# Patient Record
Sex: FEMALE | Race: WHITE | NOT HISPANIC OR LATINO | Employment: OTHER
[De-identification: names, ages, dates, MRNs, and addresses within clinical notes are randomized per-mention and may not be internally consistent; named-entity substitution may affect disease eponyms.]

---

## 2017-02-08 PROCEDURE — 36415 COLL VENOUS BLD VENIPUNCTURE: CPT | Performed by: NURSE PRACTITIONER

## 2017-02-08 PROCEDURE — 86140 C-REACTIVE PROTEIN: CPT | Performed by: NURSE PRACTITIONER

## 2017-02-08 PROCEDURE — 85652 RBC SED RATE AUTOMATED: CPT | Performed by: NURSE PRACTITIONER

## 2017-04-13 ENCOUNTER — PRIOR ORIGINAL RECORDS (OUTPATIENT)
Dept: OTHER | Age: 77
End: 2017-04-13

## 2017-04-14 LAB
ALBUMIN: 4.1 G/DL
ALKALINE PHOSPHATATE(ALK PHOS): 36 IU/L
ALT (SGPT): 19 U/L
AST (SGOT): 19 U/L
BILIRUBIN TOTAL: 0.4 MG/DL
BILIRUBIN TOTAL: 0.4 MG/DL
BUN: 21 MG/DL
CALCIUM: 9.6 MG/DL
CHLORIDE: 103 MEQ/L
CHOLESTEROL, TOTAL: 205 MG/DL
CREATININE, SERUM: 0.83 MG/DL
GLOBULIN: 2.5 G/DL
GLUCOSE: 95 MG/DL
GLUCOSE: 95 MG/DL
HDL CHOLESTEROL: 79 MG/DL
HEMATOCRIT: 40.2 %
HEMOGLOBIN: 13.2 G/DL
LDL CHOLESTEROL: 115 MG/DL
NON-HDL CHOLESTEROL: 126 MG/DL
PLATELETS: 231 K/UL
POTASSIUM, SERUM: 4.5 MEQ/L
PROTEIN, TOTAL: 6.6 G/DL
RED BLOOD COUNT: 4.27 X 10-6/U
SGOT (AST): 19 IU/L
SGPT (ALT): 19 IU/L
SODIUM: 140 MEQ/L
TOTAL CHOLESTEROL / HDL RATIO: 2.6 RATIO UN
TRIGLYCERIDES: 55 MG/DL
VITAMIN D 25-OH: 48 NG/ML
WHITE BLOOD COUNT: 4.7 X 10-3/U

## 2017-05-04 ENCOUNTER — PRIOR ORIGINAL RECORDS (OUTPATIENT)
Dept: OTHER | Age: 77
End: 2017-05-04

## 2017-05-22 ENCOUNTER — PRIOR ORIGINAL RECORDS (OUTPATIENT)
Dept: OTHER | Age: 77
End: 2017-05-22

## 2017-10-11 ENCOUNTER — HOSPITAL (OUTPATIENT)
Dept: OTHER | Age: 77
End: 2017-10-11
Attending: INTERNAL MEDICINE

## 2018-05-07 ENCOUNTER — PRIOR ORIGINAL RECORDS (OUTPATIENT)
Dept: OTHER | Age: 78
End: 2018-05-07

## 2018-05-14 ENCOUNTER — MYAURORA ACCOUNT LINK (OUTPATIENT)
Dept: OTHER | Age: 78
End: 2018-05-14

## 2018-05-14 ENCOUNTER — PRIOR ORIGINAL RECORDS (OUTPATIENT)
Dept: OTHER | Age: 78
End: 2018-05-14

## 2018-05-15 LAB
ALBUMIN: 4 G/DL
ALKALINE PHOSPHATATE(ALK PHOS): 37 IU/L
BILIRUBIN TOTAL: 0.3 MG/DL
BUN: 21 MG/DL
CALCIUM: 9 MG/DL
CHLORIDE: 103 MEQ/L
CHOLESTEROL, TOTAL: 183 MG/DL
CREATININE, SERUM: 0.75 MG/DL
GLOBULIN: 2.6 G/DL
GLUCOSE: 98 MG/DL
HDL CHOLESTEROL: 74 MG/DL
HEMATOCRIT: 40.9 %
HEMOGLOBIN: 13.2 G/DL
LDL CHOLESTEROL: 94 MG/DL
MCH: 30.5 PG
MCHC: 32.3 G/DL
MCV: 94.5 FL
PLATELETS: 250 K/UL
POTASSIUM, SERUM: 3.6 MEQ/L
PROTEIN, TOTAL: 6.6 G/DL
RED BLOOD COUNT: 4.33 X 10-6/U
SGOT (AST): 23 IU/L
SGPT (ALT): 19 IU/L
SODIUM: 141 MEQ/L
TRIGLYCERIDES: 60 MG/DL
VITAMIN D 25-OH: 41 NG/ML
WHITE BLOOD COUNT: 4.3 X 10-3/U

## 2018-05-23 ENCOUNTER — MYAURORA ACCOUNT LINK (OUTPATIENT)
Dept: OTHER | Age: 78
End: 2018-05-23

## 2018-05-23 ENCOUNTER — PRIOR ORIGINAL RECORDS (OUTPATIENT)
Dept: OTHER | Age: 78
End: 2018-05-23

## 2018-06-07 ENCOUNTER — PRIOR ORIGINAL RECORDS (OUTPATIENT)
Dept: OTHER | Age: 78
End: 2018-06-07

## 2018-10-15 ENCOUNTER — HOSPITAL (OUTPATIENT)
Dept: OTHER | Age: 78
End: 2018-10-15
Attending: INTERNAL MEDICINE

## 2019-02-28 VITALS
HEART RATE: 48 BPM | RESPIRATION RATE: 16 BRPM | WEIGHT: 107 LBS | DIASTOLIC BLOOD PRESSURE: 74 MMHG | SYSTOLIC BLOOD PRESSURE: 160 MMHG | HEIGHT: 59 IN | BODY MASS INDEX: 21.57 KG/M2

## 2019-03-01 VITALS
BODY MASS INDEX: 21.17 KG/M2 | SYSTOLIC BLOOD PRESSURE: 125 MMHG | DIASTOLIC BLOOD PRESSURE: 65 MMHG | HEART RATE: 56 BPM | HEIGHT: 59 IN | RESPIRATION RATE: 16 BRPM | WEIGHT: 105 LBS

## 2019-03-26 RX ORDER — ALPRAZOLAM 0.25 MG/1
TABLET ORAL
COMMUNITY
End: 2020-06-03 | Stop reason: CLARIF

## 2019-03-26 RX ORDER — HYDROCHLOROTHIAZIDE 25 MG/1
TABLET ORAL
COMMUNITY
End: 2020-06-05 | Stop reason: SDUPTHER

## 2019-03-26 RX ORDER — ATORVASTATIN CALCIUM 10 MG/1
TABLET, FILM COATED ORAL
COMMUNITY
End: 2019-05-22 | Stop reason: SDUPTHER

## 2019-03-26 RX ORDER — SERTRALINE HYDROCHLORIDE 25 MG/1
TABLET, FILM COATED ORAL
COMMUNITY
End: 2023-06-06 | Stop reason: ALTCHOICE

## 2019-03-28 RX ORDER — MELATONIN: COMMUNITY

## 2019-05-23 RX ORDER — ATORVASTATIN CALCIUM 10 MG/1
10 TABLET, FILM COATED ORAL DAILY
Qty: 90 TABLET | Refills: 0 | Status: SHIPPED | OUTPATIENT
Start: 2019-05-23 | End: 2019-09-14 | Stop reason: SDUPTHER

## 2019-06-04 LAB
25(OH)D3+25(OH)D2 SERPL-MCNC: 35 NG/ML
ABSOLUTE IMMATURE GRANULOCYTES (OFFPRE24): NORMAL
ALBUMIN SERPL-MCNC: 4 G/DL
ALBUMIN/GLOB SERPL: 1.6 {RATIO}
ALP SERPL-CCNC: 37 U/L
ALT SERPL-CCNC: 34 U/L
ANION GAP SERPL CALC-SCNC: NORMAL MMOL/L
AST SERPL-CCNC: 25 U/L
BASO+EOS+MONOS # BLD: NORMAL 10*3/UL
BASO+EOS+MONOS NFR BLD: NORMAL %
BASOPHILS # BLD: NORMAL 10*3/UL
BASOPHILS NFR BLD: NORMAL %
BILIRUB SERPL-MCNC: 0.6 MG/DL
BUN SERPL-MCNC: 20 MG/DL
BUN/CREAT SERPL: NORMAL
CALCIUM SERPL-MCNC: 8.9 MG/DL
CHLORIDE SERPL-SCNC: 104 MMOL/L
CHOLEST SERPL-MCNC: 171 MG/DL
CHOLEST/HDLC SERPL: 2.4 {RATIO}
CO2 SERPL-SCNC: 30 MMOL/L
CREAT SERPL-MCNC: 0.69 MG/DL
DIFFERENTIAL METHOD BLD: NORMAL
EOSINOPHIL # BLD: NORMAL 10*3/UL
EOSINOPHIL NFR BLD: NORMAL %
ERYTHROCYTE [DISTWIDTH] IN BLOOD: NORMAL %
GLOBULIN SER-MCNC: 2.5 G/DL
GLUCOSE SERPL-MCNC: 93 MG/DL
HCT VFR BLD CALC: 39.9 %
HDLC SERPL-MCNC: 72 MG/DL
HGB BLD-MCNC: 13.1 G/DL
IMMATURE GRANULOCYTES (OFFPRE25): NORMAL
LDLC SERPL CALC-MCNC: 85 MG/DL
LENGTH OF FAST TIME PATIENT: NORMAL H
LENGTH OF FAST TIME PATIENT: NORMAL H
LYMPHOCYTES # BLD: NORMAL 10*3/UL
LYMPHOCYTES NFR BLD: NORMAL %
MCH RBC QN AUTO: NORMAL PG
MCHC RBC AUTO-ENTMCNC: NORMAL G/DL
MCV RBC AUTO: NORMAL FL
MONOCYTES # BLD: NORMAL 10*3/UL
MONOCYTES NFR BLD: NORMAL %
MPV (OFFPRE2): NORMAL
NEUTROPHILS # BLD: NORMAL 10*3/UL
NEUTROPHILS NFR BLD: NORMAL %
NONHDLC SERPL-MCNC: 99 MG/DL
NRBC BLD MANUAL-RTO: NORMAL %
PLAT MORPH BLD: NORMAL
PLATELET # BLD: 263 10*3/UL
POTASSIUM SERPL-SCNC: 4.3 MMOL/L
PROT SERPL-MCNC: 6.5 G/DL
RBC # BLD: 4.28 10*6/UL
RBC MORPH BLD: NORMAL
SODIUM SERPL-SCNC: 141 MMOL/L
TRIGL SERPL-MCNC: 48 MG/DL
VLDLC SERPL CALC-MCNC: NORMAL MG/DL
WBC # BLD: 4.9 10*3/UL
WBC MORPH BLD: NORMAL

## 2019-06-07 ENCOUNTER — ANCILLARY PROCEDURE (OUTPATIENT)
Dept: CARDIOLOGY | Age: 79
End: 2019-06-07
Attending: INTERNAL MEDICINE

## 2019-06-07 DIAGNOSIS — I35.0 AORTIC STENOSIS: ICD-10-CM

## 2019-06-07 PROCEDURE — 93306 TTE W/DOPPLER COMPLETE: CPT | Performed by: INTERNAL MEDICINE

## 2019-06-12 ENCOUNTER — APPOINTMENT (OUTPATIENT)
Dept: CARDIOLOGY | Age: 79
End: 2019-06-12

## 2019-06-17 ENCOUNTER — OFFICE VISIT (OUTPATIENT)
Dept: CARDIOLOGY | Age: 79
End: 2019-06-17

## 2019-06-17 ENCOUNTER — TELEPHONE (OUTPATIENT)
Dept: CARDIOLOGY | Age: 79
End: 2019-06-17

## 2019-06-17 VITALS
BODY MASS INDEX: 21.2 KG/M2 | RESPIRATION RATE: 14 BRPM | HEIGHT: 60 IN | WEIGHT: 108 LBS | SYSTOLIC BLOOD PRESSURE: 126 MMHG | DIASTOLIC BLOOD PRESSURE: 62 MMHG | HEART RATE: 56 BPM

## 2019-06-17 DIAGNOSIS — E78.2 MIXED HYPERLIPIDEMIA: ICD-10-CM

## 2019-06-17 DIAGNOSIS — I65.23 ASYMPTOMATIC CAROTID ARTERY STENOSIS, BILATERAL: Primary | ICD-10-CM

## 2019-06-17 DIAGNOSIS — I10 ESSENTIAL HYPERTENSION: ICD-10-CM

## 2019-06-17 DIAGNOSIS — Z86.39 HISTORY OF VITAMIN D DEFICIENCY: ICD-10-CM

## 2019-06-17 PROCEDURE — 99214 OFFICE O/P EST MOD 30 MIN: CPT | Performed by: INTERNAL MEDICINE

## 2019-06-17 SDOH — HEALTH STABILITY: PHYSICAL HEALTH: ON AVERAGE, HOW MANY MINUTES DO YOU ENGAGE IN EXERCISE AT THIS LEVEL?: 30 MIN

## 2019-06-17 SDOH — HEALTH STABILITY: PHYSICAL HEALTH: ON AVERAGE, HOW MANY DAYS PER WEEK DO YOU ENGAGE IN MODERATE TO STRENUOUS EXERCISE (LIKE A BRISK WALK)?: 2 DAYS

## 2019-06-17 ASSESSMENT — ENCOUNTER SYMPTOMS
PSYCHIATRIC NEGATIVE: 1
BRUISES/BLEEDS EASILY: 0
WEIGHT LOSS: 0
ABDOMINAL PAIN: 0
DIZZINESS: 0
LIGHT-HEADEDNESS: 0
SYNCOPE: 0
HEMOPTYSIS: 0
CHILLS: 0
COUGH: 0
SUSPICIOUS LESIONS: 0
SHORTNESS OF BREATH: 0
WEIGHT GAIN: 0
HEMATOCHEZIA: 0
ENDOCRINE NEGATIVE: 1
FEVER: 0

## 2019-06-17 ASSESSMENT — PATIENT HEALTH QUESTIONNAIRE - PHQ9
SUM OF ALL RESPONSES TO PHQ9 QUESTIONS 1 AND 2: 0
SUM OF ALL RESPONSES TO PHQ9 QUESTIONS 1 AND 2: 0
1. LITTLE INTEREST OR PLEASURE IN DOING THINGS: NOT AT ALL
2. FEELING DOWN, DEPRESSED OR HOPELESS: NOT AT ALL

## 2019-06-18 ENCOUNTER — TELEPHONE (OUTPATIENT)
Dept: CARDIOLOGY | Age: 79
End: 2019-06-18

## 2019-06-19 ENCOUNTER — CLINICAL ABSTRACT (OUTPATIENT)
Dept: CARDIOLOGY | Age: 79
End: 2019-06-19

## 2019-07-07 PROBLEM — M81.0 SENILE OSTEOPOROSIS: Status: ACTIVE | Noted: 2019-07-07

## 2019-09-17 RX ORDER — ATORVASTATIN CALCIUM 10 MG/1
10 TABLET, FILM COATED ORAL DAILY
Qty: 90 TABLET | Refills: 3 | Status: SHIPPED | OUTPATIENT
Start: 2019-09-17 | End: 2021-07-13 | Stop reason: SDUPTHER

## 2019-09-30 ENCOUNTER — APPOINTMENT (OUTPATIENT)
Dept: GENERAL RADIOLOGY | Facility: HOSPITAL | Age: 79
DRG: 516 | End: 2019-09-30
Attending: EMERGENCY MEDICINE
Payer: MEDICARE

## 2019-09-30 ENCOUNTER — APPOINTMENT (OUTPATIENT)
Dept: GENERAL RADIOLOGY | Facility: HOSPITAL | Age: 79
DRG: 516 | End: 2019-09-30
Attending: ORTHOPAEDIC SURGERY
Payer: MEDICARE

## 2019-09-30 ENCOUNTER — HOSPITAL ENCOUNTER (INPATIENT)
Facility: HOSPITAL | Age: 79
LOS: 3 days | Discharge: NURSING, REHAB OR LONG-TERM CARE | DRG: 516 | End: 2019-10-03
Attending: EMERGENCY MEDICINE | Admitting: INTERNAL MEDICINE
Payer: MEDICARE

## 2019-09-30 DIAGNOSIS — S82.009A PATELLA FRACTURE: Primary | ICD-10-CM

## 2019-09-30 DIAGNOSIS — S82.002A CLOSED DISPLACED FRACTURE OF LEFT PATELLA, UNSPECIFIED FRACTURE MORPHOLOGY, INITIAL ENCOUNTER: Primary | ICD-10-CM

## 2019-09-30 PROCEDURE — 85025 COMPLETE CBC W/AUTO DIFF WBC: CPT | Performed by: EMERGENCY MEDICINE

## 2019-09-30 PROCEDURE — 93005 ELECTROCARDIOGRAM TRACING: CPT

## 2019-09-30 PROCEDURE — 96374 THER/PROPH/DIAG INJ IV PUSH: CPT

## 2019-09-30 PROCEDURE — 71045 X-RAY EXAM CHEST 1 VIEW: CPT | Performed by: EMERGENCY MEDICINE

## 2019-09-30 PROCEDURE — 86901 BLOOD TYPING SEROLOGIC RH(D): CPT | Performed by: EMERGENCY MEDICINE

## 2019-09-30 PROCEDURE — 76000 FLUOROSCOPY <1 HR PHYS/QHP: CPT | Performed by: ORTHOPAEDIC SURGERY

## 2019-09-30 PROCEDURE — 80053 COMPREHEN METABOLIC PANEL: CPT | Performed by: EMERGENCY MEDICINE

## 2019-09-30 PROCEDURE — 3E0T3BZ INTRODUCTION OF ANESTHETIC AGENT INTO PERIPHERAL NERVES AND PLEXI, PERCUTANEOUS APPROACH: ICD-10-PCS | Performed by: ANESTHESIOLOGY

## 2019-09-30 PROCEDURE — 0QSF04Z REPOSITION LEFT PATELLA WITH INTERNAL FIXATION DEVICE, OPEN APPROACH: ICD-10-PCS | Performed by: ORTHOPAEDIC SURGERY

## 2019-09-30 PROCEDURE — 99285 EMERGENCY DEPT VISIT HI MDM: CPT

## 2019-09-30 PROCEDURE — 86900 BLOOD TYPING SEROLOGIC ABO: CPT | Performed by: EMERGENCY MEDICINE

## 2019-09-30 PROCEDURE — 73560 X-RAY EXAM OF KNEE 1 OR 2: CPT | Performed by: EMERGENCY MEDICINE

## 2019-09-30 PROCEDURE — 86850 RBC ANTIBODY SCREEN: CPT | Performed by: EMERGENCY MEDICINE

## 2019-09-30 PROCEDURE — 96375 TX/PRO/DX INJ NEW DRUG ADDON: CPT

## 2019-09-30 PROCEDURE — 85610 PROTHROMBIN TIME: CPT | Performed by: EMERGENCY MEDICINE

## 2019-09-30 DEVICE — IMPLANTABLE DEVICE: Type: IMPLANTABLE DEVICE | Site: KNEE | Status: FUNCTIONAL

## 2019-09-30 RX ORDER — SENNA AND DOCUSATE SODIUM 50; 8.6 MG/1; MG/1
1 TABLET, FILM COATED ORAL DAILY
Status: DISCONTINUED | OUTPATIENT
Start: 2019-09-30 | End: 2019-10-03

## 2019-09-30 RX ORDER — ONDANSETRON 2 MG/ML
4 INJECTION INTRAMUSCULAR; INTRAVENOUS EVERY 6 HOURS PRN
Status: DISCONTINUED | OUTPATIENT
Start: 2019-09-30 | End: 2019-09-30

## 2019-09-30 RX ORDER — MORPHINE SULFATE 4 MG/ML
4 INJECTION, SOLUTION INTRAMUSCULAR; INTRAVENOUS EVERY 2 HOUR PRN
Status: DISCONTINUED | OUTPATIENT
Start: 2019-09-30 | End: 2019-09-30

## 2019-09-30 RX ORDER — ESTRADIOL 0.1 MG/G
0.5 CREAM VAGINAL SEE ADMIN INSTRUCTIONS
Status: DISCONTINUED | OUTPATIENT
Start: 2019-09-30 | End: 2019-09-30

## 2019-09-30 RX ORDER — OXYCODONE HYDROCHLORIDE 10 MG/1
10 TABLET ORAL EVERY 4 HOURS PRN
Status: ACTIVE | OUTPATIENT
Start: 2019-09-30 | End: 2019-10-02

## 2019-09-30 RX ORDER — HYDROMORPHONE HYDROCHLORIDE 1 MG/ML
0.8 INJECTION, SOLUTION INTRAMUSCULAR; INTRAVENOUS; SUBCUTANEOUS EVERY 2 HOUR PRN
Status: ACTIVE | OUTPATIENT
Start: 2019-09-30 | End: 2019-10-02

## 2019-09-30 RX ORDER — ALPRAZOLAM 0.25 MG/1
0.25 TABLET ORAL NIGHTLY PRN
Status: DISCONTINUED | OUTPATIENT
Start: 2019-09-30 | End: 2019-10-03

## 2019-09-30 RX ORDER — OXYCODONE HYDROCHLORIDE 5 MG/1
5 TABLET ORAL EVERY 4 HOURS PRN
Status: DISPENSED | OUTPATIENT
Start: 2019-09-30 | End: 2019-10-02

## 2019-09-30 RX ORDER — BUTALBITAL, ACETAMINOPHEN AND CAFFEINE 50; 325; 40 MG/1; MG/1; MG/1
1 TABLET ORAL EVERY 4 HOURS PRN
COMMUNITY
End: 2020-03-20 | Stop reason: ALTCHOICE

## 2019-09-30 RX ORDER — METOCLOPRAMIDE HYDROCHLORIDE 5 MG/ML
10 INJECTION INTRAMUSCULAR; INTRAVENOUS AS NEEDED
Status: DISCONTINUED | OUTPATIENT
Start: 2019-09-30 | End: 2019-09-30 | Stop reason: HOSPADM

## 2019-09-30 RX ORDER — DEXTROSE AND SODIUM CHLORIDE 5; .45 G/100ML; G/100ML
INJECTION, SOLUTION INTRAVENOUS CONTINUOUS
Status: DISCONTINUED | OUTPATIENT
Start: 2019-09-30 | End: 2019-10-03

## 2019-09-30 RX ORDER — HYDROMORPHONE HYDROCHLORIDE 1 MG/ML
0.4 INJECTION, SOLUTION INTRAMUSCULAR; INTRAVENOUS; SUBCUTANEOUS EVERY 5 MIN PRN
Status: DISCONTINUED | OUTPATIENT
Start: 2019-09-30 | End: 2019-09-30 | Stop reason: HOSPADM

## 2019-09-30 RX ORDER — BISACODYL 10 MG
10 SUPPOSITORY, RECTAL RECTAL
Status: DISCONTINUED | OUTPATIENT
Start: 2019-09-30 | End: 2019-10-03

## 2019-09-30 RX ORDER — BISACODYL 10 MG
10 SUPPOSITORY, RECTAL RECTAL
Status: DISCONTINUED | OUTPATIENT
Start: 2019-09-30 | End: 2019-09-30

## 2019-09-30 RX ORDER — OXYCODONE HYDROCHLORIDE 15 MG/1
15 TABLET ORAL EVERY 4 HOURS PRN
Status: ACTIVE | OUTPATIENT
Start: 2019-09-30 | End: 2019-10-02

## 2019-09-30 RX ORDER — POLYETHYLENE GLYCOL 3350 17 G/17G
17 POWDER, FOR SOLUTION ORAL DAILY PRN
Status: DISCONTINUED | OUTPATIENT
Start: 2019-09-30 | End: 2019-10-03

## 2019-09-30 RX ORDER — CEFAZOLIN SODIUM/WATER 2 G/20 ML
SYRINGE (ML) INTRAVENOUS
Status: DISCONTINUED | OUTPATIENT
Start: 2019-09-30 | End: 2019-09-30 | Stop reason: HOSPADM

## 2019-09-30 RX ORDER — ONDANSETRON 2 MG/ML
4 INJECTION INTRAMUSCULAR; INTRAVENOUS EVERY 6 HOURS PRN
Status: DISCONTINUED | OUTPATIENT
Start: 2019-09-30 | End: 2019-10-03

## 2019-09-30 RX ORDER — ONDANSETRON 2 MG/ML
4 INJECTION INTRAMUSCULAR; INTRAVENOUS AS NEEDED
Status: DISCONTINUED | OUTPATIENT
Start: 2019-09-30 | End: 2019-09-30 | Stop reason: HOSPADM

## 2019-09-30 RX ORDER — ONDANSETRON 2 MG/ML
4 INJECTION INTRAMUSCULAR; INTRAVENOUS ONCE
Status: COMPLETED | OUTPATIENT
Start: 2019-09-30 | End: 2019-09-30

## 2019-09-30 RX ORDER — SODIUM PHOSPHATE, DIBASIC AND SODIUM PHOSPHATE, MONOBASIC 7; 19 G/133ML; G/133ML
1 ENEMA RECTAL ONCE AS NEEDED
Status: DISCONTINUED | OUTPATIENT
Start: 2019-09-30 | End: 2019-09-30

## 2019-09-30 RX ORDER — ACETAMINOPHEN 325 MG/1
650 TABLET ORAL EVERY 6 HOURS PRN
Status: DISCONTINUED | OUTPATIENT
Start: 2019-09-30 | End: 2019-10-03

## 2019-09-30 RX ORDER — MORPHINE SULFATE 4 MG/ML
1 INJECTION, SOLUTION INTRAMUSCULAR; INTRAVENOUS EVERY 2 HOUR PRN
Status: DISCONTINUED | OUTPATIENT
Start: 2019-09-30 | End: 2019-09-30

## 2019-09-30 RX ORDER — CEFAZOLIN SODIUM/WATER 2 G/20 ML
2 SYRINGE (ML) INTRAVENOUS EVERY 8 HOURS
Status: COMPLETED | OUTPATIENT
Start: 2019-10-01 | End: 2019-10-01

## 2019-09-30 RX ORDER — NALOXONE HYDROCHLORIDE 0.4 MG/ML
80 INJECTION, SOLUTION INTRAMUSCULAR; INTRAVENOUS; SUBCUTANEOUS AS NEEDED
Status: DISCONTINUED | OUTPATIENT
Start: 2019-09-30 | End: 2019-09-30 | Stop reason: HOSPADM

## 2019-09-30 RX ORDER — ACETAMINOPHEN 325 MG/1
650 TABLET ORAL 4 TIMES DAILY
Status: DISPENSED | OUTPATIENT
Start: 2019-09-30 | End: 2019-10-02

## 2019-09-30 RX ORDER — SODIUM CHLORIDE, SODIUM LACTATE, POTASSIUM CHLORIDE, CALCIUM CHLORIDE 600; 310; 30; 20 MG/100ML; MG/100ML; MG/100ML; MG/100ML
INJECTION, SOLUTION INTRAVENOUS CONTINUOUS
Status: DISCONTINUED | OUTPATIENT
Start: 2019-09-30 | End: 2019-09-30 | Stop reason: HOSPADM

## 2019-09-30 RX ORDER — SERTRALINE HYDROCHLORIDE 25 MG/1
25 TABLET, FILM COATED ORAL NIGHTLY
Status: DISCONTINUED | OUTPATIENT
Start: 2019-09-30 | End: 2019-10-03

## 2019-09-30 RX ORDER — POLYETHYLENE GLYCOL 3350 17 G/17G
17 POWDER, FOR SOLUTION ORAL DAILY PRN
Status: DISCONTINUED | OUTPATIENT
Start: 2019-09-30 | End: 2019-09-30

## 2019-09-30 RX ORDER — MORPHINE SULFATE 4 MG/ML
2 INJECTION, SOLUTION INTRAMUSCULAR; INTRAVENOUS EVERY 2 HOUR PRN
Status: DISCONTINUED | OUTPATIENT
Start: 2019-09-30 | End: 2019-09-30

## 2019-09-30 RX ORDER — METOCLOPRAMIDE HYDROCHLORIDE 5 MG/ML
INJECTION INTRAMUSCULAR; INTRAVENOUS
Status: COMPLETED
Start: 2019-09-30 | End: 2019-09-30

## 2019-09-30 RX ORDER — SERTRALINE HYDROCHLORIDE 25 MG/1
25 TABLET, FILM COATED ORAL NIGHTLY
COMMUNITY
End: 2020-10-09

## 2019-09-30 RX ORDER — METOCLOPRAMIDE HYDROCHLORIDE 5 MG/ML
10 INJECTION INTRAMUSCULAR; INTRAVENOUS EVERY 8 HOURS PRN
Status: DISCONTINUED | OUTPATIENT
Start: 2019-09-30 | End: 2019-10-03

## 2019-09-30 RX ORDER — MORPHINE SULFATE 4 MG/ML
4 INJECTION, SOLUTION INTRAMUSCULAR; INTRAVENOUS ONCE
Status: COMPLETED | OUTPATIENT
Start: 2019-09-30 | End: 2019-09-30

## 2019-09-30 RX ORDER — DOCUSATE SODIUM 100 MG/1
100 CAPSULE, LIQUID FILLED ORAL 2 TIMES DAILY
Status: DISCONTINUED | OUTPATIENT
Start: 2019-09-30 | End: 2019-10-03

## 2019-09-30 RX ORDER — HYDROMORPHONE HYDROCHLORIDE 1 MG/ML
0.4 INJECTION, SOLUTION INTRAMUSCULAR; INTRAVENOUS; SUBCUTANEOUS EVERY 2 HOUR PRN
Status: DISPENSED | OUTPATIENT
Start: 2019-09-30 | End: 2019-10-02

## 2019-09-30 RX ORDER — HYDROCHLOROTHIAZIDE 25 MG/1
25 TABLET ORAL DAILY
Status: DISCONTINUED | OUTPATIENT
Start: 2019-09-30 | End: 2019-10-03

## 2019-09-30 RX ORDER — HYDROCODONE BITARTRATE AND ACETAMINOPHEN 5; 325 MG/1; MG/1
1 TABLET ORAL EVERY 4 HOURS PRN
Qty: 30 TABLET | Refills: 1 | Status: SHIPPED | OUTPATIENT
Start: 2019-09-30 | End: 2020-03-20 | Stop reason: ALTCHOICE

## 2019-09-30 RX ORDER — DOCUSATE SODIUM 100 MG/1
100 CAPSULE, LIQUID FILLED ORAL 2 TIMES DAILY
Status: DISCONTINUED | OUTPATIENT
Start: 2019-09-30 | End: 2019-09-30

## 2019-09-30 RX ORDER — HYDROCHLOROTHIAZIDE 25 MG/1
25 TABLET ORAL DAILY
COMMUNITY

## 2019-09-30 RX ORDER — ATORVASTATIN CALCIUM 10 MG/1
10 TABLET, FILM COATED ORAL
Status: DISCONTINUED | OUTPATIENT
Start: 2019-10-02 | End: 2019-10-03

## 2019-09-30 RX ORDER — SODIUM PHOSPHATE, DIBASIC AND SODIUM PHOSPHATE, MONOBASIC 7; 19 G/133ML; G/133ML
1 ENEMA RECTAL ONCE AS NEEDED
Status: DISCONTINUED | OUTPATIENT
Start: 2019-09-30 | End: 2019-10-03

## 2019-09-30 RX ORDER — HYDROMORPHONE HYDROCHLORIDE 1 MG/ML
0.2 INJECTION, SOLUTION INTRAMUSCULAR; INTRAVENOUS; SUBCUTANEOUS EVERY 2 HOUR PRN
Status: DISPENSED | OUTPATIENT
Start: 2019-09-30 | End: 2019-10-02

## 2019-09-30 RX ORDER — ASPIRIN 325 MG
325 TABLET ORAL 2 TIMES DAILY
Status: DISCONTINUED | OUTPATIENT
Start: 2019-09-30 | End: 2019-10-03

## 2019-09-30 RX ORDER — SODIUM CHLORIDE 9 MG/ML
INJECTION, SOLUTION INTRAVENOUS CONTINUOUS
Status: DISCONTINUED | OUTPATIENT
Start: 2019-09-30 | End: 2019-10-03

## 2019-09-30 NOTE — PLAN OF CARE
NURSING ADMISSION NOTE      Patient admitted via cart  Oriented to room. Safety precautions initiated. Bed in low position. Call light in reach. Admission database completed.

## 2019-09-30 NOTE — CM/SW NOTE
Met with pt who is a 66 y/o woman admitted s/p fall with closed displaced fracture of left patella. Pt stated she was walking a dog at her dtr's home when she fell outside. Pt lives alone and stated she is normally active and independent.   She has no pre

## 2019-09-30 NOTE — ED PROVIDER NOTES
Patient Seen in: [facility] Emergency Department      History   Patient presents with:  Lower Extremity Injury (musculoskeletal)    Stated Complaint: fall left knee     HPI    Patient is a 62-year-old female presents to ED for evaluation of left kne SpO2 100 %   O2 Device None (Room air)       Current:BP (!) 184/58   Pulse 62   Temp 97.6 °F (36.4 °C) (Temporal)   Resp 18   Ht 154.9 cm (5' 1\")   Wt 48.1 kg   SpO2 100%   BMI 20.03 kg/m²         Physical Exam    GENERAL: No acute distress, well appear ---------                               -----------         ------                     [address] Peñaloza (BLOOD [facility])[[id number]]                                                          ANTIBODY Dhiraj Shultz displaced fracture of left patella, unspecified fracture morphology, initial encounter  (primary encounter diagnosis)    Disposition:  Admit  9/30/2019  9:33 am    Follow-up:  No follow-up provider specified.       Medications Prescribed:  Current Discharge

## 2019-09-30 NOTE — H&P
[facility] Hospitalist H&P       CC: Patient presents with:  Lower Extremity Injury (musculoskeletal)       PCP: Marc Stover MD    History of Present Illness:  Ms. Pierre Romero is a 66 yo female with PMH of anxiety, HTN, HLD, squamous cell carcinoma Medications:    Outpatient Medications Marked as Taking for the 9/30/19 encounter [facility] Encounter):  hydrochlorothiazide 25 MG Oral Tab Take 25 mg by mouth daily. Disp:  Rfl:    Sertraline HCl 25 MG Oral Tab Take 25 mg by mouth nightly.  Disp:  Rfl: soft, NT/ND, no hepatomegaly, +BS  MSK: moving all extremities, no edema, left leg in brace, feet warm bilaterally, good pulses b/l  Neuro: no focal deficits  Skin: no rashes/lesions  Psych: normal mood/affect          Diagnostic Data:    CBC/Chem  Recent difficulties. FINDINGS:  Hyperinflation of the lungs. No focal consolidation, pleural effusion, or pneumothorax.   Two small round radiopacities and a short coil project in the region of the gastroesophageal junction may be overlying the patient, correl patients current state of illness, I anticipate that, after discharge, patient will require TBD.

## 2019-09-30 NOTE — PLAN OF CARE
Patient arrived to unit from ER via cart at 1130, family at bedside. Transferred to bed, K.I. In place. Bed in lowest position, safety precautions in place. Kept NPO patient states last PO intake was coffee and cookies at 0800 this AM. Bedrest in place.

## 2019-09-30 NOTE — PROGRESS NOTES
09/30/19 1246   Clinical Encounter Type   Referral To Nurse  (Leandro Llanes made a referral to the [employer] for [employer] as requested. )   Restoration Encounters   Spiritual Requests During Visit / Hospitalization [facility]

## 2019-09-30 NOTE — ED INITIAL ASSESSMENT (HPI)
Patient slipped and fell on the leaves outside dislocated her left knee received her per medics in a air cast  No LOC she fell forward only hit her knee

## 2019-09-30 NOTE — CONSULTS
Sanjeev Last  9/30/2019   CHIEF COMPLAINT   Patient presents with:  Lower Extremity Injury (musculoskeletal)       HISTORY OF PRESENT ILLNESS   Sanjeev Cherry is a 67year old female who presents with an injury to the left knee sustained today af (0.25 mg total) by mouth nightly as needed. Disp: 30 tablet Rfl: 5   Senna-Docusate Sodium 8.6-50 MG Oral Tab Take 1 tablet by mouth daily. Disp:  Rfl:    Melatonin 5 MG Oral Tab Take 5 mg by mouth nightly.  Disp:  Rfl:    aspirin 81 MG Oral Tab Take 81 mg +DP/PT with warm well perfused toes     Compartments: soft and compressible throughout    Lab Results   Component Value Date    WBC 5.9 09/30/2019    HGB 12.8 09/30/2019    HCT 39.0 09/30/2019    .0 09/30/2019    CREATSERUM 0.76 09/30/2019    BUN 12

## 2019-10-01 PROBLEM — Z47.89 ORTHOPEDIC AFTERCARE: Status: ACTIVE | Noted: 2019-10-01

## 2019-10-01 PROCEDURE — 97161 PT EVAL LOW COMPLEX 20 MIN: CPT

## 2019-10-01 PROCEDURE — 80048 BASIC METABOLIC PNL TOTAL CA: CPT | Performed by: ORTHOPAEDIC SURGERY

## 2019-10-01 PROCEDURE — 97110 THERAPEUTIC EXERCISES: CPT

## 2019-10-01 PROCEDURE — 85025 COMPLETE CBC W/AUTO DIFF WBC: CPT | Performed by: ORTHOPAEDIC SURGERY

## 2019-10-01 PROCEDURE — 97116 GAIT TRAINING THERAPY: CPT

## 2019-10-01 PROCEDURE — 84132 ASSAY OF SERUM POTASSIUM: CPT | Performed by: INTERNAL MEDICINE

## 2019-10-01 RX ORDER — HYDROCODONE BITARTRATE AND ACETAMINOPHEN 5; 325 MG/1; MG/1
1 TABLET ORAL EVERY 4 HOURS PRN
Status: DISCONTINUED | OUTPATIENT
Start: 2019-10-02 | End: 2019-10-03

## 2019-10-01 RX ORDER — POTASSIUM CHLORIDE 20 MEQ/1
40 TABLET, EXTENDED RELEASE ORAL EVERY 4 HOURS
Status: COMPLETED | OUTPATIENT
Start: 2019-10-01 | End: 2019-10-01

## 2019-10-01 RX ORDER — HYDROCODONE BITARTRATE AND ACETAMINOPHEN 10; 325 MG/1; MG/1
1 TABLET ORAL EVERY 4 HOURS PRN
Status: DISCONTINUED | OUTPATIENT
Start: 2019-10-02 | End: 2019-10-03

## 2019-10-01 NOTE — PHYSICAL THERAPY NOTE
t        PHYSICAL THERAPY EVALUATION - INPATIENT     Room Number: 360/360-A  Evaluation Date: 10/1/2019  Type of Evaluation: Initial  Physician Order: PT Eval and Treat    Presenting Problem: s/p ORIF left patella fracture 9/30/19  Reason for Therapy: Mobi HOME SITUATION  Type of Home: House   Home Layout: Two level  Stairs to Enter : 2  Railing: Yes  Stairs to Bedroom: 12  Railing: Yes    Lives With: Alone  Drives: Yes  Patient Owned Equipment: None       Prior Level of [place]: Pt lives in a two Sitting: Fair -  Static Standing: Poor +  Dynamic Standing: Poor    ADDITIONAL TESTS                                    NEUROLOGICAL FINDINGS                      ACTIVITY TOLERANCE  Pulse: 82  Heart Rate Source: Monitor     BP: 121/62  BP Location: Left a some, but not flat as pt with difficulty getting out of bed. Min/Mod A required for bed mobility, and L LE management. Pt required total A to lower the L LE to the floor. Pt then sat EOB x 5 minutes. Some reports of nausea and lightheaded.  BP taken and WNL activity, pain reports, and decreased overall functional mobility compared to baseline function. Functional outcome measures completed include The AM-PAC '6-Clicks' Inpatient Basic Mobility Short Form was completed and this patient is demonstrating a 76. 6 supervision     Goal #3 Patient is able to ambulate 10 feet with assist device: walker - rolling at assistance level: moderate assistance     Goal #4 Assess stairs if appropriate   Goal #5    Goal #6    Goal Comments: Goals established on 10/1/2019

## 2019-10-01 NOTE — PLAN OF CARE
Patient remains NPO, fall and DVT prevention in place. In agreement with OR tonight, medical clearance received from Dr. Kenny Richter. Patient declines need for pain medication.    To OR via bed and transport at approx 1900, daughter at bedside, patient and zaida

## 2019-10-01 NOTE — CM/SW NOTE
Spoke with PT who stated recommendation for acute rehab. Referral sent to [place] [address] via [address]. Spoke with Freddie Crews from [place] [address] regarding referral.  / to remain available for support and/or discharge planning.      Isela Mckeon, VICTORINA  Disc

## 2019-10-01 NOTE — PROGRESS NOTES
Post Op Day 1 Ortho Note s/p ORIF left patella    Status Post Nerve Block:  Type of Nerve Block: Left Femoral  Single Injection Nerve Block    Post op review: No evidence of immediate block related complications, No paresthesia noted, Able to plantar flex

## 2019-10-01 NOTE — BRIEF OP NOTE
Pre-Operative Diagnosis: Patella fracture [S82.009A]     Post-Operative Diagnosis: same as pre-op      Procedure Performed:   Procedure(s):  OPEN REDUCTION INTERNAL FIXATION LEFT PATELLA    Surgeon(s) and Role:     Brenda Caballero MD - [facility]

## 2019-10-01 NOTE — PLAN OF CARE
Rec from PACU A&O x 4. VSS. O2 3L per NC. No c/o pain to LLE. T-ROM brace in place. Denies N/T to LLE. Still DTV post op. SCD to RLE. Reviewed POC, pain management, and fall precautions with pt and son at bedside.  Plan TBD, PT/OT to see in am. Will continu

## 2019-10-01 NOTE — OPERATIVE REPORT
[facility]    PATIENT'S NAME: Jing AWAD   ATTENDING PHYSICIAN: Radha Trammell MD   OPERATING PHYSICIAN: Daiana Payan M.D.    PATIENT ACCOUNT#:   [de-identified]    LOCATION:  [address] [place] [address]  MEDICAL RECORD #:   [id number]       DATE OF BIRTH: reduced and I drove Steinmann pins through the inferior pole of the patella through the fracture site and into the proximal fracture fragment. I used a 3/32 Steinmann pin as well as a 5/64 Steinmann pin.   I then looped a heavy cerclage wire around the BRONWYN [facility] M.D.

## 2019-10-01 NOTE — PROGRESS NOTES
[facility] Hospitalist Progress Note     [facility]      SUBJECTIVE:  No acute events overnight  Doing well today  Pain controlled    OBJECTIVE:  Temp:  [98 °F (36.7 °C)-99.5 °F (37.5 °C)] 98.8 °F (37.1 °C)  Pulse:  [] 73  Resp:  [14-20] 18  BP: (87- effusion. Medial compartmental joint space narrowing consistent with arthropathy. CONCLUSION:  1. Displaced, intra-articular fracture involves the mid patella as detailed above.    Dictated by: Yudith Correia MD on 9/30/2019 at 9:22     Approved by: Paul England, Nightly   Senna-Docusate Sodium (SENOKOT S) 8.6-50 MG tab 1 tablet 1 tablet Oral Daily   Sertraline HCl (ZOLOFT) tab 25 mg 25 mg Oral Nightly   acetaminophen (TYLENOL) tab 650 mg 650 mg Oral Q6H PRN   Metoclopramide HCl (REGLAN) injection 10 mg 10 mg Intra post-operatively  - Likely also some dilution with IVF  - No christel signs of bleeding  - Monitor Hgb     # HTN/HDL  - Follows with cardiology from [address]  - has been stable on current regiment  - Statin 3 x weekly  - Hold HCTZ for now, monitor BP, resume

## 2019-10-02 PROCEDURE — 97530 THERAPEUTIC ACTIVITIES: CPT

## 2019-10-02 PROCEDURE — 80048 BASIC METABOLIC PNL TOTAL CA: CPT | Performed by: INTERNAL MEDICINE

## 2019-10-02 PROCEDURE — 85025 COMPLETE CBC W/AUTO DIFF WBC: CPT | Performed by: INTERNAL MEDICINE

## 2019-10-02 PROCEDURE — 97116 GAIT TRAINING THERAPY: CPT

## 2019-10-02 NOTE — PHYSICAL THERAPY NOTE
PHYSICAL THERAPY TREATMENT NOTE - INPATIENT    Room Number: 360/360-A     Session: 1   Number of Visits to Meet Established Goals: 5    Presenting Problem: s/p ORIF left patella fracture 9/30/19    Problem List  Principal Problem:    Closed displaced frac Static Sitting: Fair +  Dynamic Sitting: Fair +           Static Standing: Fair -  Dynamic Standing: Poor +    ACTIVITY TOLERANCE                         O2 WALK                    AM-PAC '6-Clicks' INPATIENT SHORT FO nausea. Pt left in chair, needs met.      THERAPEUTIC EXERCISES  Lower Extremity Ankle pumps     Upper Extremity n/a     Position Sitting     Repetitions   10   Sets   1     Patient End of Session: Up in chair;Needs met;Call light within reach;RN aware of s

## 2019-10-02 NOTE — PLAN OF CARE
Pt took PO [place] this AM for severe pain. Will likely require dose of IV pain medications today. Denies any numbness/tingling. Ace wrap CDI, t-rom brace in place at all times. VSS stable, SCDs on. SW updated for d/c planning to rehab.  Will continue to [facility]

## 2019-10-02 NOTE — PROGRESS NOTES
[facility]OM[facility] Hospitalist Progress Note     [facility]    CC: follow up    SUBJECTIVE:  Pt sitting up in bed, having some pain- awaiting IV dilaudid. Says norco helps but dilaudid better.   No cp/sob/n/v/f/c.  +U, no BM    OBJECTIVE:  Temp:  [98 °F (36.7 °C atorvastatin (LIPITOR) tab 10 mg 10 mg Oral Once per day on Mon Wed Fri   melatonin cap/tab 5 mg 5 mg Oral Nightly   Senna-Docusate Sodium (SENOKOT S) 8.6-50 MG tab 1 tablet 1 tablet Oral Daily   Sertraline HCl (ZOLOFT) tab 25 mg 25 mg Oral Nightly   ace current regiment  - Statin 3 x weekly  - continue HCTZ     # Anxiety  - continue home sertraline     Prophy:  DVT: SCDs, asa BID  Deconditioning prevention: PT/OT     Dispo: discharge planning  Still requiring IV pain medication    D/w MARCIE Nagy  Thank You

## 2019-10-02 NOTE — PLAN OF CARE
Patient worked w PT OT brace on as ordered. Patient required IV and PO pain medications, educated on safe pain management. Patient and family updated and in agreement with POC. Fall and DVT prevention in place. PMR consult ordered. K replaced.  Moves to

## 2019-10-02 NOTE — PLAN OF CARE
A&O x 4. VSS. On RA. LLE pain well controlled with PRN pain medications. Ace wrap dressing to left leg C/D/I. T-ROM brace in place. Denies N/T to LLE. Voiding without issue per bedpan. SCD to RLE.  Reviewed POC, pain management, and fall precautions with pt

## 2019-10-02 NOTE — CM/SW NOTE
Met with pt to discuss DC planning. Pt did not recall our previous meeting on her day of admission. Reviewed DC planning and PT recommendation for acute rehab. Discussed referral pending to [facility] for PMR consult.   Pt agreeable with [facility] for acu

## 2019-10-03 VITALS
OXYGEN SATURATION: 98 % | SYSTOLIC BLOOD PRESSURE: 142 MMHG | HEIGHT: 61 IN | TEMPERATURE: 98 F | RESPIRATION RATE: 18 BRPM | BODY MASS INDEX: 20.01 KG/M2 | WEIGHT: 106 LBS | HEART RATE: 51 BPM | DIASTOLIC BLOOD PRESSURE: 55 MMHG

## 2019-10-03 PROCEDURE — 97110 THERAPEUTIC EXERCISES: CPT

## 2019-10-03 PROCEDURE — 85027 COMPLETE CBC AUTOMATED: CPT | Performed by: HOSPITALIST

## 2019-10-03 PROCEDURE — 97116 GAIT TRAINING THERAPY: CPT

## 2019-10-03 PROCEDURE — 80048 BASIC METABOLIC PNL TOTAL CA: CPT | Performed by: INTERNAL MEDICINE

## 2019-10-03 PROCEDURE — 90471 IMMUNIZATION ADMIN: CPT

## 2019-10-03 RX ORDER — PSEUDOEPHEDRINE HCL 30 MG
100 TABLET ORAL 2 TIMES DAILY PRN
Qty: 30 CAPSULE | Refills: 0 | Status: SHIPPED | OUTPATIENT
Start: 2019-10-03

## 2019-10-03 RX ORDER — ASPIRIN 325 MG
325 TABLET ORAL 2 TIMES DAILY
Qty: 56 TABLET | Refills: 0 | Status: SHIPPED | OUTPATIENT
Start: 2019-10-03 | End: 2020-03-20 | Stop reason: ALTCHOICE

## 2019-10-03 NOTE — PHYSICAL THERAPY NOTE
PHYSICAL THERAPY TREATMENT NOTE - INPATIENT    Room Number: 360/360-A     Session: 2   Number of Visits to Meet Established Goals: 5    Presenting Problem: s/p ORIF left patella fracture 9/30/19     History related to current admission:  Pt admitted on 9/ HYSTERECTOMY  2000   • PATELLA OPEN REDUCTION INTERNAL FIXATION Left 9/30/2019    Performed by Elin Guardado MD at [address]  \"I hope I can move that leg soon\"    Patient’s self-stated goal is to return to independence.      OBJECTIVE walker  Pattern: L Decreased stance time; Shuffle(Pt unable to advance L LE without physical assist., KI )  Stoop/Curb Assistance: Not tested  Comment : Above FIM scores are defined per dept policy    Skilled Therapy Provided: Pt presents seated in recliner motion;Strengthening;Stair training;Transfer training;Balance training  Rehab Potential : Good  Frequency (Obs): Daily    URRENT GOALS   Goal #1 Patient is able to demonstrate supine - sit EOB @ level: minimum assistance      Goal #2 Patient is able to [facility]

## 2019-10-03 NOTE — PLAN OF CARE
Pain controlled with PO pain medications this AM. Pt denies numbness/tingling to BLE. Ace wrap and T-rom brace in place to left knee. Ankle pump teaching done and encouraged. SCD in place to RLE. Miralax given, pt states she is passing gas.  Will f/u with S

## 2019-10-03 NOTE — CM/SW NOTE
Patient seen by Dr Odette Garcia with recommendation for acute rehab. Spoke with Lynn Go from [place] [address] who confirmed they are able to accept pt today: room 3386. RN to call report: [phone]. Pt able to travel by [employer]. [facility] transport arranged for 1pm today.

## 2019-10-03 NOTE — CM/SW NOTE
10/03/19 1400   Discharge disposition   Expected discharge disposition Rehab [address]   Name of Facillity/Home Care/Hospice [facility]   Discharge transportation [facility]

## 2019-10-03 NOTE — DISCHARGE SUMMARY
General Medicine Discharge Summary     Patient ID:  Evelyn Garay  67year old  [id number]  8/11/1940    Admit date: 9/30/2019    Discharge date and time: 10/3/2019  1:18 PM     Attending Physician: Viridiana Lagunas MD    Primary Care Physician: Meghann cardiology from [facility]  - has been stable on current regiment  - Statin 3 x weekly  - continue HCTZ     # Anxiety  - continue home sertraline     Consults: IP CONSULT TO ORTHOPEDIC SURGERY  IP CONSULT TO SPIRITUAL CARE  IP CONSULT TO CASE MANAGEMENT  IP Fluoroscopy C-arm Time <1 Hour  (cpt=76000)    Result Date: 9/30/2019  PROCEDURE:  XR FLUOROSCOPY C-ARM TIME <1 HOUR  (CPT=76000)  INDICATIONS:  Status post ORIF left patella  COMPARISON:  None.    TECHNIQUE:  FLUOROSCOPY IMAGES OBTAINED:  2 FLUOROSCOPY ALLYSON Tab  Take 10 mg by mouth daily. , Historical    Melatonin 5 MG Oral Tab  Take 5 mg by mouth nightly., Historical          Home Medication Changes:      Activity: as directed  Diet: as directed  Wound Care: as directed  Code Status: Full Code  O2: prn    Fo

## 2019-10-03 NOTE — PROGRESS NOTES
[facility] Hospitalist Progress Note     [facility]    CC: follow up    SUBJECTIVE:  Pt sitting up in chair, SW at bedside. Calling daughter.   Pain controlled  OBJECTIVE:  Temp:  [97.8 °F (36.6 °C)-98.6 °F (37 °C)] 98.3 °F (36.8 °C)  Pulse:  [50-70] 50 Prior to discharge   atorvastatin (LIPITOR) tab 10 mg 10 mg Oral Once per day on Mon Wed Fri   melatonin cap/tab 5 mg 5 mg Oral Nightly   Senna-Docusate Sodium (SENOKOT S) 8.6-50 MG tab 1 tablet 1 tablet Oral Daily   Sertraline HCl (ZOLOFT) tab 25 mg 25 mg Cait Del Castillo MD    [facility] Hospitalist  Answering Service number: [phone]

## 2019-10-03 NOTE — PROGRESS NOTES
[facility]    Progress Note    Belinda Tuttle Patient Status:  Inpatient    1940 MRN [id number]   [facility] 3SW-A Attending No att. providers found   [facility] Day # 3 PCP Eriberto Tolentino MD       Subjective:   POD #3 l (L) 02/28/2012    CK 72 02/29/2012                   ODALYS Bishop  [birth year]97  Discussed with Dr. Chin Sep

## 2019-10-03 NOTE — PROGRESS NOTES
IV removed. Pt d/c'd to [facility] acute rehab. Sent with Rx for [place] and ASA.

## 2019-10-03 NOTE — CONSULTS
[address] [place] [address] Patient Status:  Inpatient    1940 MRN [id number]   [facility] 3SW-A Attending Valentino Papas, *   Hosp Day # 3 PCP Eliel Posey MD     Patient Identification  Anderson Leyden Medical History:   Diagnosis Date   • Anxiety state    • Cancer [facility])    • Carotid artery disease ([facility] [address]     <50%   • Cervical osteoarthritis    • Diverticulosis    • High blood pressure    • High cholesterol    • History of squamous cell carcinoma 2013 infusion  Intravenous Continuous   [] acetaminophen (TYLENOL) tab 650 mg 650 mg Oral QID   [] oxyCODONE HCl (OXY-IR) cap/tab 5 mg 5 mg Oral Q4H PRN   Or      [] OxyCODONE HCl IR (ROXICODONE) immediate release tab 10 mg 10 mg Oral Q4H P Prozac [Fluoxetine]       Shrimp                  HIVES  Tramadol                NAUSEA ONLY        Lab Results   Component Value Date    WBC 6.6 10/03/2019    HGB 9.6 10/03/2019    HCT 29.3 10/03/2019    .0 10/03/2019    CREATSERUM 0.70 10/03/201 grossly intact. Sensation to dull touch intact in all extremities and reflexes are 2+, bilateral and symmetric for biceps, brachioradialis, triceps, patella and achilles throughout. Babinski negative bilaterally. Hays's negative bilaterally.  Finger to Advance directives reviewed and noted. Would be happy to reassess should medical and/or functional status change. Will follow. Thank you for the consult. Jocelin Phillips MD  [facility].

## 2019-12-09 ENCOUNTER — LAB REQUISITION (OUTPATIENT)
Dept: LAB | Facility: HOSPITAL | Age: 79
End: 2019-12-09
Payer: MEDICARE

## 2019-12-09 DIAGNOSIS — L02.01 CUTANEOUS ABSCESS OF FACE: ICD-10-CM

## 2019-12-09 DIAGNOSIS — L72.3 SEBACEOUS CYST: ICD-10-CM

## 2019-12-09 DIAGNOSIS — R23.9 UNSPECIFIED SKIN CHANGES: ICD-10-CM

## 2019-12-09 PROCEDURE — 87205 SMEAR GRAM STAIN: CPT | Performed by: PLASTIC SURGERY

## 2019-12-09 PROCEDURE — 87075 CULTR BACTERIA EXCEPT BLOOD: CPT | Performed by: PLASTIC SURGERY

## 2019-12-09 PROCEDURE — 87186 SC STD MICRODIL/AGAR DIL: CPT | Performed by: PLASTIC SURGERY

## 2019-12-09 PROCEDURE — 87070 CULTURE OTHR SPECIMN AEROBIC: CPT | Performed by: PLASTIC SURGERY

## 2019-12-09 PROCEDURE — 87077 CULTURE AEROBIC IDENTIFY: CPT | Performed by: PLASTIC SURGERY

## 2020-02-10 ENCOUNTER — HOSPITAL ENCOUNTER (OUTPATIENT)
Dept: MAMMOGRAPHY | Age: 80
Discharge: HOME | End: 2020-02-10
Attending: INTERNAL MEDICINE

## 2020-02-10 DIAGNOSIS — Z12.31 ENCOUNTER FOR SCREENING MAMMOGRAM FOR MALIGNANT NEOPLASM OF BREAST: ICD-10-CM

## 2020-02-10 PROCEDURE — 77063 BREAST TOMOSYNTHESIS BI: CPT

## 2020-05-28 ENCOUNTER — TELEPHONE (OUTPATIENT)
Dept: CARDIOLOGY | Age: 80
End: 2020-05-28

## 2020-05-29 LAB
ALBUMIN SERPL-MCNC: 4 G/DL
ALP SERPL-CCNC: 42 U/L
ALT SERPL-CCNC: 24 U/L
AST SERPL-CCNC: 24 U/L
BILIRUB SERPL-MCNC: 0.3 MG/DL
BUN SERPL-MCNC: 16 MG/DL
CALCIUM SERPL-MCNC: 9.4 MG/DL
CHLORIDE SERPL-SCNC: 101 MMOL/L
CHOLEST SERPL-MCNC: 164 MG/DL
CO2 SERPL-SCNC: 24 MMOL/L
CREAT SERPL-MCNC: 0.72 MG/DL
GLOBULIN SER-MCNC: 2.5 G/DL
GLUCOSE SERPL-MCNC: 98 MG/DL
HCT VFR BLD CALC: 41.6 %
HDLC SERPL-MCNC: 64 MG/DL
HGB BLD-MCNC: 13.4 G/DL
LDLC SERPL CALC-MCNC: 89 MG/DL
PLATELET # BLD: 268 10*3/UL
POTASSIUM SERPL-SCNC: 4.1 MMOL/L
PROT SERPL-MCNC: 6.5 G/DL
RBC # BLD: 4.44 10*6/UL
SODIUM SERPL-SCNC: 142 MMOL/L
TRIGL SERPL-MCNC: 54 MG/DL
WBC # BLD: 4.5 10*3/UL

## 2020-06-01 ENCOUNTER — CLINICAL ABSTRACT (OUTPATIENT)
Dept: CARDIOLOGY | Age: 80
End: 2020-06-01

## 2020-06-03 ENCOUNTER — ANCILLARY PROCEDURE (OUTPATIENT)
Dept: CARDIOLOGY | Age: 80
End: 2020-06-03
Attending: INTERNAL MEDICINE

## 2020-06-03 ENCOUNTER — OFFICE VISIT (OUTPATIENT)
Dept: CARDIOLOGY | Age: 80
End: 2020-06-03

## 2020-06-03 VITALS
HEIGHT: 60 IN | SYSTOLIC BLOOD PRESSURE: 148 MMHG | HEART RATE: 58 BPM | WEIGHT: 111 LBS | DIASTOLIC BLOOD PRESSURE: 60 MMHG | BODY MASS INDEX: 21.79 KG/M2

## 2020-06-03 DIAGNOSIS — R00.2 PALPITATIONS: ICD-10-CM

## 2020-06-03 DIAGNOSIS — Z86.39 HISTORY OF VITAMIN D DEFICIENCY: ICD-10-CM

## 2020-06-03 DIAGNOSIS — E78.2 MIXED HYPERLIPIDEMIA: ICD-10-CM

## 2020-06-03 DIAGNOSIS — I10 ESSENTIAL HYPERTENSION: ICD-10-CM

## 2020-06-03 DIAGNOSIS — I65.23 ASYMPTOMATIC CAROTID ARTERY STENOSIS, BILATERAL: Primary | ICD-10-CM

## 2020-06-03 PROCEDURE — 93000 ELECTROCARDIOGRAM COMPLETE: CPT | Performed by: INTERNAL MEDICINE

## 2020-06-03 PROCEDURE — 99214 OFFICE O/P EST MOD 30 MIN: CPT | Performed by: INTERNAL MEDICINE

## 2020-06-03 SDOH — HEALTH STABILITY: PHYSICAL HEALTH: ON AVERAGE, HOW MANY DAYS PER WEEK DO YOU ENGAGE IN MODERATE TO STRENUOUS EXERCISE (LIKE A BRISK WALK)?: 0 DAYS

## 2020-06-03 SDOH — HEALTH STABILITY: PHYSICAL HEALTH: ON AVERAGE, HOW MANY MINUTES DO YOU ENGAGE IN EXERCISE AT THIS LEVEL?: 0 MIN

## 2020-06-03 ASSESSMENT — ENCOUNTER SYMPTOMS
HEMATOCHEZIA: 0
COUGH: 0
HEMOPTYSIS: 0
WEIGHT LOSS: 0
SYNCOPE: 0
DIZZINESS: 0
BRUISES/BLEEDS EASILY: 0
LIGHT-HEADEDNESS: 0
ABDOMINAL PAIN: 0
CHILLS: 0
SHORTNESS OF BREATH: 0
ENDOCRINE NEGATIVE: 1
FEVER: 0
PSYCHIATRIC NEGATIVE: 1
SUSPICIOUS LESIONS: 0
WEIGHT GAIN: 0

## 2020-06-05 RX ORDER — HYDROCHLOROTHIAZIDE 25 MG/1
25 TABLET ORAL DAILY
Qty: 90 TABLET | Refills: 3 | Status: SHIPPED | OUTPATIENT
Start: 2020-06-05 | End: 2021-04-16

## 2020-06-06 ENCOUNTER — ANESTHESIA EVENT (OUTPATIENT)
Dept: SURGERY | Facility: HOSPITAL | Age: 80
End: 2020-06-06
Payer: MEDICARE

## 2020-06-08 ENCOUNTER — TELEPHONE (OUTPATIENT)
Dept: CARDIOLOGY | Age: 80
End: 2020-06-08

## 2020-06-08 PROCEDURE — 93227 XTRNL ECG REC<48 HR R&I: CPT | Performed by: INTERNAL MEDICINE

## 2020-06-16 ENCOUNTER — LAB ENCOUNTER (OUTPATIENT)
Dept: LAB | Facility: HOSPITAL | Age: 80
End: 2020-06-16
Attending: ORTHOPAEDIC SURGERY
Payer: MEDICARE

## 2020-06-16 DIAGNOSIS — T84.84XA PAINFUL ORTHOPAEDIC HARDWARE (HCC): ICD-10-CM

## 2020-06-18 ENCOUNTER — APPOINTMENT (OUTPATIENT)
Dept: CARDIOLOGY | Age: 80
End: 2020-06-18

## 2020-06-18 NOTE — H&P
[address]    PATIENT'S NAME: Keenan Riley   ATTENDING PHYSICIAN: Ayo Calderon M.D.    PATIENT ACCOUNT#:   [de-identified]    LOCATION:    MEDICAL RECORD #:   [id number]       YOB: 1940  ADMISSION DATE:       06/19/2020    HISTORY AN hives; Paxil, nausea and vomiting; adhesive tape, hives; Cipro, hives; Prozac, shrimp, hives; tramadol, nausea. SOCIAL HISTORY:  Patient lives in [place]. She has never smoked. She does drink alcohol, less than 1 ounce per week.   She denies illicit

## 2020-06-18 NOTE — ANESTHESIA PREPROCEDURE EVALUATION
PRE-OP EVALUATION    Patient Name: Khari Giordano    Pre-op Diagnosis: Painful orthopaedic hardware [facility]) Leny Cabrera    Procedure(s):  REMOVAL OF HARDWARE LEFT KNEE    Surgeon(s) and Role:     Meseret Martinez MD - Primary    Pre-op vitals reviewed. ([facility] [address]      <50%  • Cervical osteoarthritis    • Diverticulosis    • High blood pressure    • High cholesterol    • History of squamous cell carcinoma 2013    upper lip - Dr. Noe Tolentino  • HTN (hypertension)    • Hypercholesterolemia    • IBS (irritable bowel with: patient and child/children                Present on Admission:  **None**

## 2020-06-19 ENCOUNTER — HOSPITAL ENCOUNTER (OUTPATIENT)
Facility: HOSPITAL | Age: 80
Setting detail: HOSPITAL OUTPATIENT SURGERY
Discharge: HOME | End: 2020-06-19
Attending: ORTHOPAEDIC SURGERY | Admitting: ORTHOPAEDIC SURGERY
Payer: MEDICARE

## 2020-06-19 ENCOUNTER — ANESTHESIA (OUTPATIENT)
Dept: SURGERY | Facility: HOSPITAL | Age: 80
End: 2020-06-19
Payer: MEDICARE

## 2020-06-19 ENCOUNTER — APPOINTMENT (OUTPATIENT)
Dept: GENERAL RADIOLOGY | Facility: HOSPITAL | Age: 80
End: 2020-06-19
Attending: ORTHOPAEDIC SURGERY
Payer: MEDICARE

## 2020-06-19 VITALS
HEART RATE: 51 BPM | RESPIRATION RATE: 16 BRPM | DIASTOLIC BLOOD PRESSURE: 55 MMHG | WEIGHT: 106.38 LBS | SYSTOLIC BLOOD PRESSURE: 143 MMHG | TEMPERATURE: 98 F | BODY MASS INDEX: 20.88 KG/M2 | HEIGHT: 60 IN | OXYGEN SATURATION: 99 %

## 2020-06-19 DIAGNOSIS — T84.84XA PAINFUL ORTHOPAEDIC HARDWARE (HCC): Primary | ICD-10-CM

## 2020-06-19 PROCEDURE — 76942 ECHO GUIDE FOR BIOPSY: CPT | Performed by: ANESTHESIOLOGY

## 2020-06-19 PROCEDURE — 0QPF04Z REMOVAL OF INTERNAL FIXATION DEVICE FROM LEFT PATELLA, OPEN APPROACH: ICD-10-PCS | Performed by: ORTHOPAEDIC SURGERY

## 2020-06-19 PROCEDURE — 3E0T3BZ INTRODUCTION OF ANESTHETIC AGENT INTO PERIPHERAL NERVES AND PLEXI, PERCUTANEOUS APPROACH: ICD-10-PCS | Performed by: ANESTHESIOLOGY

## 2020-06-19 RX ORDER — ACETAMINOPHEN 500 MG
1000 TABLET ORAL ONCE
Status: DISCONTINUED | OUTPATIENT
Start: 2020-06-19 | End: 2020-06-19 | Stop reason: HOSPADM

## 2020-06-19 RX ORDER — ONDANSETRON 2 MG/ML
4 INJECTION INTRAMUSCULAR; INTRAVENOUS AS NEEDED
Status: DISCONTINUED | OUTPATIENT
Start: 2020-06-19 | End: 2020-06-19

## 2020-06-19 RX ORDER — DEXAMETHASONE SODIUM PHOSPHATE 4 MG/ML
VIAL (ML) INJECTION AS NEEDED
Status: DISCONTINUED | OUTPATIENT
Start: 2020-06-19 | End: 2020-06-19 | Stop reason: SURG

## 2020-06-19 RX ORDER — MEPERIDINE HYDROCHLORIDE 25 MG/ML
12.5 INJECTION INTRAMUSCULAR; INTRAVENOUS; SUBCUTANEOUS AS NEEDED
Status: DISCONTINUED | OUTPATIENT
Start: 2020-06-19 | End: 2020-06-19

## 2020-06-19 RX ORDER — SODIUM CHLORIDE, SODIUM LACTATE, POTASSIUM CHLORIDE, CALCIUM CHLORIDE 600; 310; 30; 20 MG/100ML; MG/100ML; MG/100ML; MG/100ML
INJECTION, SOLUTION INTRAVENOUS CONTINUOUS
Status: DISCONTINUED | OUTPATIENT
Start: 2020-06-19 | End: 2020-06-19

## 2020-06-19 RX ORDER — KETOROLAC TROMETHAMINE 30 MG/ML
INJECTION, SOLUTION INTRAMUSCULAR; INTRAVENOUS AS NEEDED
Status: DISCONTINUED | OUTPATIENT
Start: 2020-06-19 | End: 2020-06-19 | Stop reason: SURG

## 2020-06-19 RX ORDER — DIPHENHYDRAMINE HYDROCHLORIDE 50 MG/ML
12.5 INJECTION INTRAMUSCULAR; INTRAVENOUS AS NEEDED
Status: DISCONTINUED | OUTPATIENT
Start: 2020-06-19 | End: 2020-06-19

## 2020-06-19 RX ORDER — ONDANSETRON 2 MG/ML
INJECTION INTRAMUSCULAR; INTRAVENOUS AS NEEDED
Status: DISCONTINUED | OUTPATIENT
Start: 2020-06-19 | End: 2020-06-19 | Stop reason: SURG

## 2020-06-19 RX ORDER — CEFAZOLIN SODIUM/WATER 2 G/20 ML
2 SYRINGE (ML) INTRAVENOUS ONCE
Status: COMPLETED | OUTPATIENT
Start: 2020-06-19 | End: 2020-06-19

## 2020-06-19 RX ORDER — MIDAZOLAM HYDROCHLORIDE 1 MG/ML
1 INJECTION INTRAMUSCULAR; INTRAVENOUS EVERY 5 MIN PRN
Status: DISCONTINUED | OUTPATIENT
Start: 2020-06-19 | End: 2020-06-19

## 2020-06-19 RX ORDER — HYDROMORPHONE HYDROCHLORIDE 1 MG/ML
0.4 INJECTION, SOLUTION INTRAMUSCULAR; INTRAVENOUS; SUBCUTANEOUS EVERY 5 MIN PRN
Status: DISCONTINUED | OUTPATIENT
Start: 2020-06-19 | End: 2020-06-19

## 2020-06-19 RX ORDER — HYDROCODONE BITARTRATE AND ACETAMINOPHEN 10; 325 MG/1; MG/1
1 TABLET ORAL EVERY 6 HOURS PRN
Qty: 30 TABLET | Refills: 0 | Status: SHIPPED | OUTPATIENT
Start: 2020-06-19 | End: 2020-07-04

## 2020-06-19 RX ORDER — HYDROCODONE BITARTRATE AND ACETAMINOPHEN 5; 325 MG/1; MG/1
1 TABLET ORAL AS NEEDED
Status: COMPLETED | OUTPATIENT
Start: 2020-06-19 | End: 2020-06-19

## 2020-06-19 RX ORDER — NALOXONE HYDROCHLORIDE 0.4 MG/ML
80 INJECTION, SOLUTION INTRAMUSCULAR; INTRAVENOUS; SUBCUTANEOUS AS NEEDED
Status: DISCONTINUED | OUTPATIENT
Start: 2020-06-19 | End: 2020-06-19

## 2020-06-19 RX ORDER — METOCLOPRAMIDE HYDROCHLORIDE 5 MG/ML
10 INJECTION INTRAMUSCULAR; INTRAVENOUS AS NEEDED
Status: DISCONTINUED | OUTPATIENT
Start: 2020-06-19 | End: 2020-06-19

## 2020-06-19 RX ORDER — HYDROCODONE BITARTRATE AND ACETAMINOPHEN 5; 325 MG/1; MG/1
2 TABLET ORAL AS NEEDED
Status: COMPLETED | OUTPATIENT
Start: 2020-06-19 | End: 2020-06-19

## 2020-06-19 RX ADMIN — SODIUM CHLORIDE, SODIUM LACTATE, POTASSIUM CHLORIDE, CALCIUM CHLORIDE: 600; 310; 30; 20 INJECTION, SOLUTION INTRAVENOUS at 07:44:00

## 2020-06-19 RX ADMIN — CEFAZOLIN SODIUM/WATER 2 G: 2 G/20 ML SYRINGE (ML) INTRAVENOUS at 07:18:00

## 2020-06-19 RX ADMIN — KETOROLAC TROMETHAMINE 15 MG: 30 INJECTION, SOLUTION INTRAMUSCULAR; INTRAVENOUS at 07:41:00

## 2020-06-19 RX ADMIN — ONDANSETRON 4 MG: 2 INJECTION INTRAMUSCULAR; INTRAVENOUS at 07:41:00

## 2020-06-19 RX ADMIN — DEXAMETHASONE SODIUM PHOSPHATE 4 MG: 4 MG/ML VIAL (ML) INJECTION at 07:16:00

## 2020-06-19 NOTE — BRIEF OP NOTE
Pre-Operative Diagnosis: Painful orthopaedic hardware [facility]) [T84.84XA]     Post-Operative Diagnosis: Painful orthopaedic hardware [facility]) [U77.48RO]      Procedure Performed:   Procedure(s):  REMOVAL OF HARDWARE LEFT KNEE    Surgeon(s) and Role:     Sherri Sargent,

## 2020-06-19 NOTE — ANESTHESIA PROCEDURE NOTES
Airway  Urgency: elective      General Information and Staff    Patient location during procedure: OR  Anesthesiologist: Jesse Levin MD  Performed: anesthesiologist     Indications and Patient Condition  Indications for airway management: anesthesia  Uma

## 2020-06-19 NOTE — INTERVAL H&P NOTE
Pre-op Diagnosis: Painful orthopaedic hardware [facility]) Jasen Chavez    The above referenced H&P was reviewed by ODALYS Odonnell on 6/19/2020, the patient was examined and no significant changes have occurred in the patient's condition since the H&P was perfor

## 2020-06-19 NOTE — INTERVAL H&P NOTE
Pre-op Diagnosis: Painful orthopaedic hardware [facility]) Jordy Eaton    The above referenced H&P was reviewed by Coretta Huff MD on 6/19/2020, the patient was examined and no significant changes have occurred in the patient's condition since the H&P was perfo

## 2020-06-19 NOTE — ANESTHESIA PROCEDURE NOTES
[facility]  Performed by: Royal Shadia MD  Authorized by: Royal Shadia MD       General Information and Staff    Start Time:  6/19/2020 7:07 AM  End Time:  6/19/2020 7:09 AM  Anesthesiologist:  Royal Shadia MD  Performed by:   Anesthesiologist  Patient

## 2020-06-19 NOTE — ANESTHESIA POSTPROCEDURE EVALUATION
[address] [facility] Patient Status:  Hospital Outpatient Surgery   Age/Gender 67year old female MRN [id number]   Location [address] [facility] Attending Ion Carias MD   Hosp Day # 0 PCP Angel Egan,

## 2020-06-20 NOTE — OPERATIVE REPORT
[facility]    PATIENT'S NAME: Mayito Hussein   ATTENDING PHYSICIAN: Rosa Centeno M.D. OPERATING PHYSICIAN: Rosa Centeno M.D.    PATIENT ACCOUNT#:   [de-identified]    LOCATION:  [address] [place] [address] [address]  MEDICAL RECORD #:   [id number] used to elevate this wire. The 2 neutralization pins inferiorly were easily palpable. Bovie cautery was used to release the soft tissues from these pins and there were easily withdrawn from the patella. The wire was then cut with a .   I shania

## 2020-08-17 PROBLEM — M51.9 LUMBAR DISC DISEASE: Status: ACTIVE | Noted: 2020-08-17

## 2020-10-21 PROBLEM — S82.002A CLOSED DISPLACED FRACTURE OF LEFT PATELLA: Status: RESOLVED | Noted: 2019-09-30 | Resolved: 2020-10-21

## 2020-10-21 PROBLEM — Z47.89 ORTHOPEDIC AFTERCARE: Status: RESOLVED | Noted: 2019-10-01 | Resolved: 2020-10-21

## 2021-01-05 ENCOUNTER — APPOINTMENT (OUTPATIENT)
Dept: GENERAL RADIOLOGY | Facility: HOSPITAL | Age: 81
End: 2021-01-05
Attending: EMERGENCY MEDICINE
Payer: MEDICARE

## 2021-01-05 ENCOUNTER — HOSPITAL ENCOUNTER (EMERGENCY)
Facility: HOSPITAL | Age: 81
Discharge: HOME | End: 2021-01-05
Attending: EMERGENCY MEDICINE
Payer: MEDICARE

## 2021-01-05 VITALS
TEMPERATURE: 97 F | WEIGHT: 108 LBS | HEART RATE: 51 BPM | BODY MASS INDEX: 21.2 KG/M2 | SYSTOLIC BLOOD PRESSURE: 180 MMHG | RESPIRATION RATE: 20 BRPM | HEIGHT: 60 IN | OXYGEN SATURATION: 100 % | DIASTOLIC BLOOD PRESSURE: 100 MMHG

## 2021-01-05 DIAGNOSIS — M54.9 MUSCULOSKELETAL BACK PAIN: Primary | ICD-10-CM

## 2021-01-05 LAB
ALBUMIN SERPL-MCNC: 3.6 G/DL (ref 3.4–5)
ALBUMIN/GLOB SERPL: 1 {RATIO} (ref 1–2)
ALP LIVER SERPL-CCNC: 40 U/L
ALT SERPL-CCNC: 31 U/L
ANION GAP SERPL CALC-SCNC: 7 MMOL/L (ref 0–18)
AST SERPL-CCNC: 23 U/L (ref 15–37)
BASOPHILS # BLD AUTO: 0.03 X10(3) UL (ref 0–0.2)
BASOPHILS NFR BLD AUTO: 0.6 %
BILIRUB SERPL-MCNC: 0.2 MG/DL (ref 0.1–2)
BUN BLD-MCNC: 17 MG/DL (ref 7–18)
BUN/CREAT SERPL: 18.3 (ref 10–20)
CALCIUM BLD-MCNC: 9.1 MG/DL (ref 8.5–10.1)
CHLORIDE SERPL-SCNC: 104 MMOL/L (ref 98–112)
CO2 SERPL-SCNC: 27 MMOL/L (ref 21–32)
CREAT BLD-MCNC: 0.93 MG/DL
D-DIMER: <0.27 UG/ML FEU (ref ?–0.79)
DEPRECATED RDW RBC AUTO: 46.3 FL (ref 35.1–46.3)
EOSINOPHIL # BLD AUTO: 0.07 X10(3) UL (ref 0–0.7)
EOSINOPHIL NFR BLD AUTO: 1.4 %
ERYTHROCYTE [DISTWIDTH] IN BLOOD BY AUTOMATED COUNT: 13.9 % (ref 11–15)
GLOBULIN PLAS-MCNC: 3.5 G/DL (ref 2.8–4.4)
GLUCOSE BLD-MCNC: 86 MG/DL (ref 70–99)
HCT VFR BLD AUTO: 37.5 %
HGB BLD-MCNC: 12.6 G/DL
IMM GRANULOCYTES # BLD AUTO: 0.01 X10(3) UL (ref 0–1)
IMM GRANULOCYTES NFR BLD: 0.2 %
LYMPHOCYTES # BLD AUTO: 2.05 X10(3) UL (ref 1–4)
LYMPHOCYTES NFR BLD AUTO: 39.8 %
M PROTEIN MFR SERPL ELPH: 7.1 G/DL (ref 6.4–8.2)
MCH RBC QN AUTO: 30.5 PG (ref 26–34)
MCHC RBC AUTO-ENTMCNC: 33.6 G/DL (ref 31–37)
MCV RBC AUTO: 90.8 FL
MONOCYTES # BLD AUTO: 0.5 X10(3) UL (ref 0.1–1)
MONOCYTES NFR BLD AUTO: 9.7 %
NEUTROPHILS # BLD AUTO: 2.49 X10 (3) UL (ref 1.5–7.7)
NEUTROPHILS # BLD AUTO: 2.49 X10(3) UL (ref 1.5–7.7)
NEUTROPHILS NFR BLD AUTO: 48.3 %
OSMOLALITY SERPL CALC.SUM OF ELEC: 287 MOSM/KG (ref 275–295)
PLATELET # BLD AUTO: 240 10(3)UL (ref 150–450)
POTASSIUM SERPL-SCNC: 3.5 MMOL/L (ref 3.5–5.1)
RBC # BLD AUTO: 4.13 X10(6)UL
SODIUM SERPL-SCNC: 138 MMOL/L (ref 136–145)
TROPONIN I SERPL-MCNC: <0.045 NG/ML (ref ?–0.04)
WBC # BLD AUTO: 5.2 X10(3) UL (ref 4–11)

## 2021-01-05 PROCEDURE — 80053 COMPREHEN METABOLIC PANEL: CPT | Performed by: EMERGENCY MEDICINE

## 2021-01-05 PROCEDURE — 85025 COMPLETE CBC W/AUTO DIFF WBC: CPT | Performed by: EMERGENCY MEDICINE

## 2021-01-05 PROCEDURE — 93010 ELECTROCARDIOGRAM REPORT: CPT

## 2021-01-05 PROCEDURE — 71045 X-RAY EXAM CHEST 1 VIEW: CPT | Performed by: EMERGENCY MEDICINE

## 2021-01-05 PROCEDURE — 99285 EMERGENCY DEPT VISIT HI MDM: CPT

## 2021-01-05 PROCEDURE — 93005 ELECTROCARDIOGRAM TRACING: CPT

## 2021-01-05 PROCEDURE — 84484 ASSAY OF TROPONIN QUANT: CPT | Performed by: EMERGENCY MEDICINE

## 2021-01-05 PROCEDURE — 85379 FIBRIN DEGRADATION QUANT: CPT | Performed by: EMERGENCY MEDICINE

## 2021-01-05 PROCEDURE — 96374 THER/PROPH/DIAG INJ IV PUSH: CPT

## 2021-01-05 RX ORDER — KETOROLAC TROMETHAMINE 30 MG/ML
15 INJECTION, SOLUTION INTRAMUSCULAR; INTRAVENOUS ONCE
Status: COMPLETED | OUTPATIENT
Start: 2021-01-05 | End: 2021-01-05

## 2021-01-05 NOTE — ED PROVIDER NOTES
Patient Seen in: [facility] Emergency Department      History   Patient presents with:  Back Pain    Stated Complaint: Rt upper back pain w/ history of pleurisy, was instructed to ER for blood work *    HPI/Subjective:   HPI    67-year-old female co • COLONOSCOPY  2010    Dr. Dia Chaney   • COLONOSCOPY N/A 11/8/2013    Performed by Juliette Richter MD at [facility] ENDOSCOPY   • HYSTERECTOMY  2000   • [address] Left 6/19/2020    Performed by Ranjan Escobedo MD at [place] [address] Notable for the following components:       Result Value    GFR, Non-African American 59 (*)     Alkaline Phosphatase 40 (*)     All other components within normal limits   TROPONIN I - Normal   D-DIMER - Normal   CBC WITH DIFFERENTIAL WITH PLATELET    [place]

## 2021-01-05 NOTE — ED INITIAL ASSESSMENT (HPI)
Pt here today for right upper back pain ~ was sent over for further lab workup, EKG = NSR.  Reports hx of pleurisy

## 2021-01-06 LAB
ATRIAL RATE: 54 BPM
P AXIS: 68 DEGREES
P-R INTERVAL: 148 MS
Q-T INTERVAL: 486 MS
QRS DURATION: 96 MS
QTC CALCULATION (BEZET): 460 MS
R AXIS: 52 DEGREES
T AXIS: 54 DEGREES
VENTRICULAR RATE: 54 BPM

## 2021-02-22 ENCOUNTER — HOSPITAL ENCOUNTER (OUTPATIENT)
Dept: MAMMOGRAPHY | Age: 81
Discharge: HOME | End: 2021-02-22
Attending: INTERNAL MEDICINE

## 2021-02-22 DIAGNOSIS — Z12.31 ENCOUNTER FOR SCREENING MAMMOGRAM FOR MALIGNANT NEOPLASM OF BREAST: ICD-10-CM

## 2021-02-22 PROCEDURE — 77063 BREAST TOMOSYNTHESIS BI: CPT

## 2021-03-10 ENCOUNTER — OFFICE VISIT (OUTPATIENT)
Dept: CARDIOLOGY | Age: 81
End: 2021-03-10

## 2021-03-10 VITALS
WEIGHT: 110.4 LBS | SYSTOLIC BLOOD PRESSURE: 156 MMHG | HEART RATE: 62 BPM | DIASTOLIC BLOOD PRESSURE: 66 MMHG | HEIGHT: 60 IN | BODY MASS INDEX: 21.68 KG/M2

## 2021-03-10 DIAGNOSIS — I65.23 ASYMPTOMATIC CAROTID ARTERY STENOSIS, BILATERAL: ICD-10-CM

## 2021-03-10 DIAGNOSIS — I10 ESSENTIAL HYPERTENSION: Primary | ICD-10-CM

## 2021-03-10 DIAGNOSIS — E55.9 HYPOVITAMINOSIS D: ICD-10-CM

## 2021-03-10 DIAGNOSIS — E78.2 MIXED HYPERLIPIDEMIA: ICD-10-CM

## 2021-03-10 DIAGNOSIS — Z86.39 HISTORY OF VITAMIN D DEFICIENCY: ICD-10-CM

## 2021-03-10 PROCEDURE — 99214 OFFICE O/P EST MOD 30 MIN: CPT | Performed by: INTERNAL MEDICINE

## 2021-03-10 RX ORDER — SENNOSIDES A AND B 8.6 MG/1
2 TABLET, FILM COATED ORAL
COMMUNITY
Start: 2019-10-11 | End: 2023-06-06 | Stop reason: ALTCHOICE

## 2021-03-10 SDOH — HEALTH STABILITY: PHYSICAL HEALTH: ON AVERAGE, HOW MANY MINUTES DO YOU ENGAGE IN EXERCISE AT THIS LEVEL?: 0 MIN

## 2021-03-10 SDOH — HEALTH STABILITY: PHYSICAL HEALTH: ON AVERAGE, HOW MANY DAYS PER WEEK DO YOU ENGAGE IN MODERATE TO STRENUOUS EXERCISE (LIKE A BRISK WALK)?: 0 DAYS

## 2021-03-10 ASSESSMENT — ENCOUNTER SYMPTOMS
SHORTNESS OF BREATH: 0
PSYCHIATRIC NEGATIVE: 1
LIGHT-HEADEDNESS: 0
SYNCOPE: 0
HEMATOCHEZIA: 0
COUGH: 0
BRUISES/BLEEDS EASILY: 0
WEIGHT GAIN: 0
CHILLS: 0
DIZZINESS: 0
HEMOPTYSIS: 0
ENDOCRINE NEGATIVE: 1
WEIGHT LOSS: 0
ABDOMINAL PAIN: 0
SUSPICIOUS LESIONS: 0
FEVER: 0

## 2021-03-10 ASSESSMENT — PATIENT HEALTH QUESTIONNAIRE - PHQ9
SUM OF ALL RESPONSES TO PHQ9 QUESTIONS 1 AND 2: 0
CLINICAL INTERPRETATION OF PHQ9 SCORE: NO FURTHER SCREENING NEEDED
SUM OF ALL RESPONSES TO PHQ9 QUESTIONS 1 AND 2: 0
1. LITTLE INTEREST OR PLEASURE IN DOING THINGS: NOT AT ALL
CLINICAL INTERPRETATION OF PHQ2 SCORE: NO FURTHER SCREENING NEEDED
2. FEELING DOWN, DEPRESSED OR HOPELESS: NOT AT ALL

## 2021-03-15 DIAGNOSIS — Z23 NEED FOR VACCINATION: ICD-10-CM

## 2021-04-16 RX ORDER — HYDROCHLOROTHIAZIDE 25 MG/1
TABLET ORAL
Qty: 90 TABLET | Refills: 3 | Status: SHIPPED | OUTPATIENT
Start: 2021-04-16 | End: 2021-06-02

## 2021-06-02 RX ORDER — HYDROCHLOROTHIAZIDE 25 MG/1
TABLET ORAL
Qty: 90 TABLET | Refills: 2 | Status: SHIPPED | OUTPATIENT
Start: 2021-06-02 | End: 2021-07-08 | Stop reason: SDUPTHER

## 2021-06-08 ENCOUNTER — HOSPITAL ENCOUNTER (INPATIENT)
Facility: HOSPITAL | Age: 81
LOS: 1 days | Discharge: HOME | DRG: 392 | End: 2021-06-10
Attending: EMERGENCY MEDICINE | Admitting: HOSPITALIST
Payer: MEDICARE

## 2021-06-08 DIAGNOSIS — K57.92 ACUTE DIVERTICULITIS: Primary | ICD-10-CM

## 2021-06-08 PROCEDURE — 96366 THER/PROPH/DIAG IV INF ADDON: CPT

## 2021-06-08 PROCEDURE — 99285 EMERGENCY DEPT VISIT HI MDM: CPT

## 2021-06-08 PROCEDURE — 96376 TX/PRO/DX INJ SAME DRUG ADON: CPT

## 2021-06-08 PROCEDURE — 80053 COMPREHEN METABOLIC PANEL: CPT | Performed by: EMERGENCY MEDICINE

## 2021-06-08 PROCEDURE — 96375 TX/PRO/DX INJ NEW DRUG ADDON: CPT

## 2021-06-08 PROCEDURE — 96365 THER/PROPH/DIAG IV INF INIT: CPT

## 2021-06-08 PROCEDURE — 85025 COMPLETE CBC W/AUTO DIFF WBC: CPT | Performed by: EMERGENCY MEDICINE

## 2021-06-08 RX ORDER — MORPHINE SULFATE 4 MG/ML
4 INJECTION, SOLUTION INTRAMUSCULAR; INTRAVENOUS EVERY 30 MIN PRN
Status: DISCONTINUED | OUTPATIENT
Start: 2021-06-08 | End: 2021-06-10

## 2021-06-08 RX ORDER — ONDANSETRON 2 MG/ML
4 INJECTION INTRAMUSCULAR; INTRAVENOUS ONCE
Status: COMPLETED | OUTPATIENT
Start: 2021-06-08 | End: 2021-06-08

## 2021-06-09 ENCOUNTER — APPOINTMENT (OUTPATIENT)
Dept: CT IMAGING | Facility: HOSPITAL | Age: 81
DRG: 392 | End: 2021-06-09
Attending: EMERGENCY MEDICINE
Payer: MEDICARE

## 2021-06-09 PROBLEM — K57.92 ACUTE DIVERTICULITIS: Status: ACTIVE | Noted: 2021-06-09

## 2021-06-09 PROCEDURE — 74177 CT ABD & PELVIS W/CONTRAST: CPT | Performed by: EMERGENCY MEDICINE

## 2021-06-09 RX ORDER — METOCLOPRAMIDE HYDROCHLORIDE 5 MG/ML
10 INJECTION INTRAMUSCULAR; INTRAVENOUS EVERY 8 HOURS PRN
Status: DISCONTINUED | OUTPATIENT
Start: 2021-06-09 | End: 2021-06-10

## 2021-06-09 RX ORDER — HYDROCHLOROTHIAZIDE 25 MG/1
25 TABLET ORAL DAILY
Status: DISCONTINUED | OUTPATIENT
Start: 2021-06-09 | End: 2021-06-10

## 2021-06-09 RX ORDER — DEXTROSE 5 % IN WATER
PIGGYBACK WITH THREADED PORT (ML) INTRAVENOUS
Status: COMPLETED
Start: 2021-06-09 | End: 2021-06-09

## 2021-06-09 RX ORDER — SENNA AND DOCUSATE SODIUM 50; 8.6 MG/1; MG/1
2 TABLET, FILM COATED ORAL DAILY
Status: DISCONTINUED | OUTPATIENT
Start: 2021-06-09 | End: 2021-06-10

## 2021-06-09 RX ORDER — SODIUM PHOSPHATE, DIBASIC AND SODIUM PHOSPHATE, MONOBASIC 7; 19 G/133ML; G/133ML
1 ENEMA RECTAL ONCE AS NEEDED
Status: ACTIVE | OUTPATIENT
Start: 2021-06-09 | End: 2021-06-09

## 2021-06-09 RX ORDER — ALPRAZOLAM 0.25 MG/1
0.25 TABLET ORAL NIGHTLY PRN
COMMUNITY

## 2021-06-09 RX ORDER — HEPARIN SODIUM 5000 [USP'U]/ML
5000 INJECTION, SOLUTION INTRAVENOUS; SUBCUTANEOUS EVERY 8 HOURS SCHEDULED
Status: DISCONTINUED | OUTPATIENT
Start: 2021-06-09 | End: 2021-06-10

## 2021-06-09 RX ORDER — ACETAMINOPHEN 325 MG/1
650 TABLET ORAL EVERY 6 HOURS PRN
Status: DISCONTINUED | OUTPATIENT
Start: 2021-06-09 | End: 2021-06-10

## 2021-06-09 RX ORDER — SERTRALINE HYDROCHLORIDE 25 MG/1
25 TABLET, FILM COATED ORAL NIGHTLY
Status: DISCONTINUED | OUTPATIENT
Start: 2021-06-09 | End: 2021-06-10

## 2021-06-09 RX ORDER — PIPERACILLIN SODIUM, TAZOBACTAM SODIUM 3; .375 G/15ML; G/15ML
INJECTION, POWDER, LYOPHILIZED, FOR SOLUTION INTRAVENOUS
Status: COMPLETED
Start: 2021-06-09 | End: 2021-06-09

## 2021-06-09 RX ORDER — ASPIRIN 81 MG/1
81 TABLET ORAL DAILY
Status: DISCONTINUED | OUTPATIENT
Start: 2021-06-10 | End: 2021-06-10

## 2021-06-09 RX ORDER — ATORVASTATIN CALCIUM 10 MG/1
10 TABLET, FILM COATED ORAL DAILY
Status: DISCONTINUED | OUTPATIENT
Start: 2021-06-09 | End: 2021-06-10

## 2021-06-09 RX ORDER — ONDANSETRON 2 MG/ML
4 INJECTION INTRAMUSCULAR; INTRAVENOUS EVERY 6 HOURS PRN
Status: DISCONTINUED | OUTPATIENT
Start: 2021-06-09 | End: 2021-06-10

## 2021-06-09 RX ORDER — SODIUM CHLORIDE 9 MG/ML
INJECTION, SOLUTION INTRAVENOUS CONTINUOUS
Status: DISCONTINUED | OUTPATIENT
Start: 2021-06-09 | End: 2021-06-10

## 2021-06-09 NOTE — PLAN OF CARE
Care Plan:   Problem: Pre-existing medical condition  Goal: Maintenance of stable medical condition  Description: INTERVENTIONS:  - Administer medications and treatments as ordered  - Monitor for and notify internist of changes as needed  - Assess vital si

## 2021-06-09 NOTE — H&P
General Medicine H&P     Patient presents with:  Abdomen/Flank Pain       PCP: Delvin Garzon MD    History of Present Illness: Patient is a 79year old female with PMH including but not limited to anxiety, HTN, migraines who p/t [facility] ED c abd pain and dive HIVES  Prozac [Fluoxetine]     UNKNOWN  Shrimp                  HIVES  Tramadol                NAUSEA ONLY     Heparin Sodium (Porcine), 5,000 Units, Q8H [address]  piperacillin-tazobactam, 3.375 g, Q8H        Social History    Tobacco Use      Smoking stat [employer]) NRDR ([address]) which includes the Dose Index Registry. PATIENT STATED HISTORY:(As transcribed by Technologist)  Right and left lower quadrant pain today. Diagnosed of diverticulitis 4 days ago.  Pain came back above.  If patient has prior exams, these would be helpful for further evaluation and to assess stability of the above findings. ED M.D. notified of these findings with preliminary radiology report from [facility] services.     Dictated by (CST): Arthur REYNAGA, nonaneurysmal abdominal aorta and its branches. LYMPH NODES:  No upper abdominal, retroperitoneal, or pelvic lymphadenopathy is noted. BODY WALL/OSSEOUS STRUCTURES:  No acute or suspicious osseous abnormality is evident.  Moderate degenerative changes at th

## 2021-06-09 NOTE — ED PROVIDER NOTES
Patient Seen in: [facility] Emergency Department      History   Patient presents with:  Abdomen/Flank Pain    Stated Complaint: abd pain    HPI/Subjective:   HPI    78-year-old female presents for evaluation of abdominal pain.   Patient had sudden on used    Alcohol use: Yes      Alcohol/week: 0.0 standard drinks      Comment: 4 DRINKS MONTH    Drug use: Never             Review of Systems    Positive for stated complaint: abd pain  Other systems are as noted in HPI.   Constitutional and vital signs rev these tests on the individual orders. URINALYSIS WITH CULTURE REFLEX                [facility]        Admission disposition: 6/9/2021  5:05 AM           Labs unremarkable. CT scan read by vision radiology shows 2 areas of diverticulitis in the sigmoid colon.

## 2021-06-09 NOTE — PLAN OF CARE
Pt admitted via bed by transport. VSS, AOx4, ambulates with steady gait, standby, no mobility equipment. Denies pain at this time. Denies TYLER/SOB/lightheadedness. IVF infusing per md orders. Fall precautions in place. POC discussed. Bed in low position.  Ca

## 2021-06-09 NOTE — PLAN OF CARE
Consult called to Dr. Noni Anguiano; if patient can tolerate diet, okay to send home and resume ABT course. Updated patient on plan of care; patient uncomfortable with decision.  Notified covering hospitalist. Also requesting something for bowel movements- repor

## 2021-06-09 NOTE — ED INITIAL ASSESSMENT (HPI)
Dx with diverticulitis 4 days ago with oral ABT, started to have intolerable pain this pm. + nausea.

## 2021-06-09 NOTE — CONSULTS
[facility]    Report of Consultation    Ivon Sun Patient Status:  Inpatient    1940 MRN [id number]   [facility] 3NW-A Attending Brenda Pedro MD   Hosp Day # 0 PCP Ethel Waggoner MD     Date of Admission:   RHINOPLASTY   • OTHER  06/2020    removal of hardware L knee   Dr. Yonis Roa     Family History   Problem Relation Age of Onset   • Heart Disorder Father    • Cancer Mother         panc   • Cancer Brother         esophagus      reports that she has never s neg  CARDIOVASCULAR: neg  GI: as above  NEURO: neg  PSYCHE:  neg      Physical Exam:    Blood pressure 119/48, pulse 63, temperature 98.6 °F (37 °C), temperature source Oral, resp.  rate 16, height 5' (1.524 m), weight 108 lb (49 kg), SpO2 98 %, not current to tolerate po without worse pain  -  Zosyn while here  -  Upon discharge, return back on Augmentin x total of 14 days. I called in an extra 4 days to her original 10 day course. -  Low residue diet x 14 days.   Diet instructions from [facility]

## 2021-06-10 VITALS
OXYGEN SATURATION: 97 % | BODY MASS INDEX: 21.2 KG/M2 | RESPIRATION RATE: 18 BRPM | HEIGHT: 60 IN | SYSTOLIC BLOOD PRESSURE: 128 MMHG | DIASTOLIC BLOOD PRESSURE: 55 MMHG | TEMPERATURE: 98 F | HEART RATE: 44 BPM | WEIGHT: 108 LBS

## 2021-06-10 PROCEDURE — 84132 ASSAY OF SERUM POTASSIUM: CPT | Performed by: HOSPITALIST

## 2021-06-10 RX ORDER — AMOXICILLIN AND CLAVULANATE POTASSIUM 875; 125 MG/1; MG/1
1 TABLET, FILM COATED ORAL 2 TIMES DAILY
Qty: 20 TABLET | Refills: 0 | Status: SHIPPED | OUTPATIENT
Start: 2021-06-10 | End: 2021-06-20

## 2021-06-10 NOTE — PLAN OF CARE
A & O x 4, forgetful, anxious; redirectable. Tolerating diet; denies nausea. Reports passing gas, had bm. Up ad charmaine. Msg sent to hospitalist to clarify ABT course. Updated pt on dc meds. All questions answered to patient's satisfaction.          NU

## 2021-06-10 NOTE — PROGRESS NOTES
[facility]    Progress Note    Gordo Tristan Patient Status:  Inpatient    1940 MRN [id number]   [facility] 3NW-A Attending Alecia Marie,*   Hosp Day # 1 PCP All Mccollum MD     Subjective:  oGrdo Hudson

## 2021-06-10 NOTE — PLAN OF CARE
Pt is A&O, VSS, and denies pain. Pt is on RA with bilateral diminished lung sounds, . Abdomen is soft and nontender. Tolerating soft diet. SCDs in place. Right AC infusing. Ad charmaine. Pt is resting, call light in reach, will continue to monitor.       Probl Assess pain using appropriate pain scale  - Administer analgesics based on type and severity of pain and evaluate response  - Implement non-pharmacological measures as appropriate and evaluate response  - Consider cultural and social influences on pain and

## 2021-06-10 NOTE — DISCHARGE SUMMARY
General Medicine Discharge Summary     Patient ID:  Sanjeev Last  79year old  8/11/1940    Admit date: 6/8/2021    Discharge date and time:  6/10/21    Attending Physician: No att. providers found today.    Consults: IP CONSULT TO GASTROENTEROLOGY    Operative Procedures:        Patient instructions:      Discharge Medication List as of 6/10/2021  1:54 PM    CONTINUE these medications which have CHANGED    Amoxicillin-Pot Clavulanate 875-125 MG Oral MD Elliot Quintero MD  [facility] Hospitalist  Pager: [phone]

## 2021-07-02 RX ORDER — HYDROCHLOROTHIAZIDE 25 MG/1
TABLET ORAL
Qty: 90 TABLET | Refills: 2 | OUTPATIENT
Start: 2021-07-02

## 2021-07-06 RX ORDER — HYDROCHLOROTHIAZIDE 25 MG/1
TABLET ORAL
Qty: 90 TABLET | Refills: 2 | OUTPATIENT
Start: 2021-07-06

## 2021-07-08 ENCOUNTER — TELEPHONE (OUTPATIENT)
Dept: CARDIOLOGY | Age: 81
End: 2021-07-08

## 2021-07-08 RX ORDER — HYDROCHLOROTHIAZIDE 25 MG/1
25 TABLET ORAL DAILY
Qty: 90 TABLET | Refills: 2 | Status: SHIPPED | OUTPATIENT
Start: 2021-07-08 | End: 2021-12-22

## 2021-07-14 RX ORDER — ATORVASTATIN CALCIUM 10 MG/1
10 TABLET, FILM COATED ORAL DAILY
Qty: 90 TABLET | Refills: 2 | Status: SHIPPED | OUTPATIENT
Start: 2021-07-14 | End: 2022-04-28

## 2021-08-10 PROBLEM — K57.92 ACUTE DIVERTICULITIS: Status: RESOLVED | Noted: 2021-06-09 | Resolved: 2021-08-10

## 2021-10-05 ENCOUNTER — LAB ENCOUNTER (OUTPATIENT)
Dept: LAB | Facility: HOSPITAL | Age: 81
End: 2021-10-05
Attending: INTERNAL MEDICINE
Payer: MEDICARE

## 2021-10-05 DIAGNOSIS — Z01.812 ENCOUNTER FOR PREOPERATIVE SCREENING LABORATORY TESTING FOR COVID-19 VIRUS: ICD-10-CM

## 2021-10-05 DIAGNOSIS — Z20.822 ENCOUNTER FOR PREOPERATIVE SCREENING LABORATORY TESTING FOR COVID-19 VIRUS: ICD-10-CM

## 2021-10-08 ENCOUNTER — HOSPITAL ENCOUNTER (OUTPATIENT)
Facility: HOSPITAL | Age: 81
Setting detail: HOSPITAL OUTPATIENT SURGERY
Discharge: HOME | End: 2021-10-08
Attending: INTERNAL MEDICINE | Admitting: INTERNAL MEDICINE
Payer: MEDICARE

## 2021-10-08 ENCOUNTER — ANESTHESIA (OUTPATIENT)
Dept: ENDOSCOPY | Facility: HOSPITAL | Age: 81
End: 2021-10-08
Payer: MEDICARE

## 2021-10-08 ENCOUNTER — ANESTHESIA EVENT (OUTPATIENT)
Dept: ENDOSCOPY | Facility: HOSPITAL | Age: 81
End: 2021-10-08
Payer: MEDICARE

## 2021-10-08 VITALS
TEMPERATURE: 98 F | DIASTOLIC BLOOD PRESSURE: 53 MMHG | BODY MASS INDEX: 21.2 KG/M2 | HEIGHT: 60 IN | OXYGEN SATURATION: 100 % | RESPIRATION RATE: 14 BRPM | SYSTOLIC BLOOD PRESSURE: 120 MMHG | HEART RATE: 62 BPM | WEIGHT: 108 LBS

## 2021-10-08 DIAGNOSIS — Z01.812 ENCOUNTER FOR PREOPERATIVE SCREENING LABORATORY TESTING FOR COVID-19 VIRUS: Primary | ICD-10-CM

## 2021-10-08 DIAGNOSIS — Z86.010 PERSONAL HISTORY OF COLONIC POLYPS: ICD-10-CM

## 2021-10-08 DIAGNOSIS — Z20.822 ENCOUNTER FOR PREOPERATIVE SCREENING LABORATORY TESTING FOR COVID-19 VIRUS: Primary | ICD-10-CM

## 2021-10-08 DIAGNOSIS — Z87.19 PERSONAL HISTORY OF UNSPECIFIED DIGESTIVE DISEASE: ICD-10-CM

## 2021-10-08 PROCEDURE — 0DJD8ZZ INSPECTION OF LOWER INTESTINAL TRACT, VIA NATURAL OR ARTIFICIAL OPENING ENDOSCOPIC: ICD-10-PCS | Performed by: INTERNAL MEDICINE

## 2021-10-08 RX ORDER — NALOXONE HYDROCHLORIDE 0.4 MG/ML
80 INJECTION, SOLUTION INTRAMUSCULAR; INTRAVENOUS; SUBCUTANEOUS AS NEEDED
Status: DISCONTINUED | OUTPATIENT
Start: 2021-10-08 | End: 2021-10-08

## 2021-10-08 RX ORDER — SODIUM CHLORIDE, SODIUM LACTATE, POTASSIUM CHLORIDE, CALCIUM CHLORIDE 600; 310; 30; 20 MG/100ML; MG/100ML; MG/100ML; MG/100ML
INJECTION, SOLUTION INTRAVENOUS CONTINUOUS
Status: DISCONTINUED | OUTPATIENT
Start: 2021-10-08 | End: 2021-10-08

## 2021-10-08 RX ORDER — LIDOCAINE HYDROCHLORIDE 10 MG/ML
INJECTION, SOLUTION EPIDURAL; INFILTRATION; INTRACAUDAL; PERINEURAL AS NEEDED
Status: DISCONTINUED | OUTPATIENT
Start: 2021-10-08 | End: 2021-10-08 | Stop reason: SURG

## 2021-10-08 RX ADMIN — LIDOCAINE HYDROCHLORIDE 25 MG: 10 INJECTION, SOLUTION EPIDURAL; INFILTRATION; INTRACAUDAL; PERINEURAL at 11:50:00

## 2021-10-08 NOTE — ANESTHESIA PREPROCEDURE EVALUATION
PRE-OP EVALUATION    Patient Name: Hossein Almanzar    Admit Diagnosis: Personal history of unspecified digestive disease [Z87.19]  Personal history of colonic polyps [Z86.010]    Pre-op Diagnosis: Personal history of unspecified digestive disease [Z87. 1 Unknown time  BUTALBITAL-APAP-CAFFEINE -40 MG Oral Tab, TAKE 1 TABLET BY MOUTH EVERY 4 HOURS AS NEEDED, Disp: 30 tablet, Rfl: 2, Past Week at Unknown time  docusate sodium 100 MG Oral Cap, Take 100 mg by mouth 2 (two) times daily as needed for consti [facility]  • Mild mitral regurgitation 06/2019    ECHO at [facility]  • Multiple thyroid nodules      last US 2018   no fu needed  • Osteoporosis    • PONV (postoperative nausea and vomiting)    • Tubular adenoma 2010    Dr. Comer Coad  • Vitamin D deficiency           Pas

## 2021-10-08 NOTE — ANESTHESIA POSTPROCEDURE EVALUATION
[address] Patient Status:  Hospital Outpatient Surgery   Age/Gender [de-identified]year old adult MRN [id number]   Location [address] [address] Attending Adam Sanchez MD   Hosp Day # 0 PCP All Mccollum MD       Anesth

## 2021-10-26 PROBLEM — M51.9 LUMBAR DISC DISEASE: Status: RESOLVED | Noted: 2020-08-17 | Resolved: 2021-10-26

## 2021-12-01 ENCOUNTER — TELEPHONE (OUTPATIENT)
Dept: CARDIOLOGY | Age: 81
End: 2021-12-01

## 2021-12-22 RX ORDER — HYDROCHLOROTHIAZIDE 25 MG/1
25 TABLET ORAL DAILY
Qty: 90 TABLET | Refills: 0 | Status: SHIPPED | OUTPATIENT
Start: 2021-12-22 | End: 2022-06-17 | Stop reason: SDUPTHER

## 2022-03-24 ENCOUNTER — APPOINTMENT (OUTPATIENT)
Dept: CARDIOLOGY | Age: 82
End: 2022-03-24
Attending: INTERNAL MEDICINE

## 2022-04-25 ENCOUNTER — APPOINTMENT (OUTPATIENT)
Dept: CARDIOLOGY | Age: 82
End: 2022-04-25
Attending: INTERNAL MEDICINE

## 2022-04-27 ENCOUNTER — HOSPITAL ENCOUNTER (OUTPATIENT)
Dept: MAMMOGRAPHY | Age: 82
Discharge: HOME | End: 2022-04-27
Attending: INTERNAL MEDICINE

## 2022-04-27 DIAGNOSIS — Z12.31 ENCOUNTER FOR SCREENING MAMMOGRAM FOR MALIGNANT NEOPLASM OF BREAST: ICD-10-CM

## 2022-04-27 PROCEDURE — 77063 BREAST TOMOSYNTHESIS BI: CPT

## 2022-04-28 RX ORDER — ATORVASTATIN CALCIUM 10 MG/1
10 TABLET, FILM COATED ORAL DAILY
Qty: 90 TABLET | Refills: 0 | Status: SHIPPED | OUTPATIENT
Start: 2022-04-28 | End: 2022-05-04 | Stop reason: DRUGHIGH

## 2022-05-02 ENCOUNTER — ANCILLARY PROCEDURE (OUTPATIENT)
Dept: CARDIOLOGY | Age: 82
End: 2022-05-02
Attending: INTERNAL MEDICINE

## 2022-05-02 DIAGNOSIS — Z86.39 HISTORY OF VITAMIN D DEFICIENCY: ICD-10-CM

## 2022-05-02 DIAGNOSIS — I65.23 ASYMPTOMATIC CAROTID ARTERY STENOSIS, BILATERAL: ICD-10-CM

## 2022-05-02 DIAGNOSIS — E78.2 MIXED HYPERLIPIDEMIA: ICD-10-CM

## 2022-05-02 DIAGNOSIS — I10 ESSENTIAL HYPERTENSION: ICD-10-CM

## 2022-05-02 PROCEDURE — 93880 EXTRACRANIAL BILAT STUDY: CPT | Performed by: INTERNAL MEDICINE

## 2022-05-04 ENCOUNTER — OFFICE VISIT (OUTPATIENT)
Dept: CARDIOLOGY | Age: 82
End: 2022-05-04

## 2022-05-04 VITALS
WEIGHT: 110.4 LBS | SYSTOLIC BLOOD PRESSURE: 138 MMHG | HEIGHT: 60 IN | BODY MASS INDEX: 21.68 KG/M2 | DIASTOLIC BLOOD PRESSURE: 66 MMHG | HEART RATE: 74 BPM

## 2022-05-04 DIAGNOSIS — I10 PRIMARY HYPERTENSION: ICD-10-CM

## 2022-05-04 DIAGNOSIS — I49.8 ATRIAL ARRHYTHMIA: ICD-10-CM

## 2022-05-04 DIAGNOSIS — E55.9 HYPOVITAMINOSIS D: ICD-10-CM

## 2022-05-04 DIAGNOSIS — Z86.39 HISTORY OF VITAMIN D DEFICIENCY: ICD-10-CM

## 2022-05-04 DIAGNOSIS — I65.23 ASYMPTOMATIC CAROTID ARTERY STENOSIS, BILATERAL: ICD-10-CM

## 2022-05-04 DIAGNOSIS — E78.2 MIXED HYPERLIPIDEMIA: Primary | ICD-10-CM

## 2022-05-04 DIAGNOSIS — I70.0 ATHEROSCLEROSIS OF ABDOMINAL AORTA (CMD): ICD-10-CM

## 2022-05-04 PROCEDURE — 99214 OFFICE O/P EST MOD 30 MIN: CPT | Performed by: INTERNAL MEDICINE

## 2022-05-04 RX ORDER — GABAPENTIN 300 MG/1
CAPSULE ORAL
COMMUNITY
Start: 2022-02-16 | End: 2023-06-06 | Stop reason: ALTCHOICE

## 2022-05-04 RX ORDER — ATORVASTATIN CALCIUM 20 MG/1
20 TABLET, FILM COATED ORAL DAILY
Qty: 30 TABLET | Refills: 2 | Status: SHIPPED | OUTPATIENT
Start: 2022-05-04 | End: 2022-11-28

## 2022-05-04 RX ORDER — AMOXICILLIN 250 MG
1 CAPSULE ORAL PRN
COMMUNITY

## 2022-05-04 RX ORDER — ESTRADIOL 0.1 MG/G
CREAM VAGINAL
COMMUNITY
Start: 2022-02-25 | End: 2023-06-06 | Stop reason: ALTCHOICE

## 2022-05-04 RX ORDER — ATORVASTATIN CALCIUM 20 MG/1
20 TABLET, FILM COATED ORAL DAILY
COMMUNITY
End: 2022-05-04 | Stop reason: SDUPTHER

## 2022-05-04 SDOH — HEALTH STABILITY: PHYSICAL HEALTH: ON AVERAGE, HOW MANY DAYS PER WEEK DO YOU ENGAGE IN MODERATE TO STRENUOUS EXERCISE (LIKE A BRISK WALK)?: 0 DAYS

## 2022-05-04 SDOH — HEALTH STABILITY: PHYSICAL HEALTH: ON AVERAGE, HOW MANY MINUTES DO YOU ENGAGE IN EXERCISE AT THIS LEVEL?: 0 MIN

## 2022-05-04 ASSESSMENT — PATIENT HEALTH QUESTIONNAIRE - PHQ9
1. LITTLE INTEREST OR PLEASURE IN DOING THINGS: NOT AT ALL
SUM OF ALL RESPONSES TO PHQ9 QUESTIONS 1 AND 2: 0
2. FEELING DOWN, DEPRESSED OR HOPELESS: NOT AT ALL
SUM OF ALL RESPONSES TO PHQ9 QUESTIONS 1 AND 2: 0
CLINICAL INTERPRETATION OF PHQ2 SCORE: NO FURTHER SCREENING NEEDED

## 2022-05-10 ENCOUNTER — APPOINTMENT (OUTPATIENT)
Dept: CARDIOLOGY | Age: 82
End: 2022-05-10
Attending: INTERNAL MEDICINE

## 2022-05-18 ENCOUNTER — TELEPHONE (OUTPATIENT)
Dept: CARDIOLOGY | Age: 82
End: 2022-05-18

## 2022-05-18 ENCOUNTER — ANCILLARY PROCEDURE (OUTPATIENT)
Dept: CARDIOLOGY | Age: 82
End: 2022-05-18
Attending: INTERNAL MEDICINE

## 2022-05-18 DIAGNOSIS — I10 PRIMARY HYPERTENSION: ICD-10-CM

## 2022-05-18 DIAGNOSIS — Z86.39 HISTORY OF VITAMIN D DEFICIENCY: ICD-10-CM

## 2022-05-18 DIAGNOSIS — I70.0 ATHEROSCLEROSIS OF ABDOMINAL AORTA (CMD): ICD-10-CM

## 2022-05-18 DIAGNOSIS — I49.8 ATRIAL ARRHYTHMIA: ICD-10-CM

## 2022-05-18 DIAGNOSIS — I65.23 ASYMPTOMATIC CAROTID ARTERY STENOSIS, BILATERAL: ICD-10-CM

## 2022-05-18 DIAGNOSIS — E78.2 MIXED HYPERLIPIDEMIA: ICD-10-CM

## 2022-05-18 LAB
AORTIC VALVE AREA: NORMAL
ASCENDING AORTA (AAD): 2.8
AV MEAN GRADIENT (AVMG): NORMAL
AV MEAN VELOCITY (AVMV): NORMAL
AV PEAK GRADIENT (AVPG): 12
AV PEAK VELOCITY (AVPV): 1.71
AV STENOSIS SEVERITY TEXT: NORMAL
DOP CALC LVOT PEAK VEL (LVOTPV): 0.84
E WAVE DECELARATION TIME (MDT): 234
EST RIGHT VENT SYSTOLIC PRESSURE BY TRICUSPID REGURGITATION JET (RVSP): 24.51
LEFT INTERNAL DIMENSION IN SYSTOLE (LVSD): 3.11
LEFT VENTRICULAR INTERNAL DIMENSION IN DIASTOLE (LVDD): 3.97
LV EF: NORMAL %
LVOT 2D (LVOTD): 1.7
MV E TISSUE VEL LAT (MELV): 7.51
MV E TISSUE VEL MED (MESV): 5.77
MV E WAVE VEL/E TISSUE VEL MED(MSR): 11.8
TRICUSPID ANNULAR PLANE SYSTOLIC EXCURSION (TAPSE): 3.08
TRICUSPID VALVE ANNULAR PEAK VELOCITY (TVAPV): 13.3

## 2022-05-18 PROCEDURE — 76376 3D RENDER W/INTRP POSTPROCES: CPT | Performed by: INTERNAL MEDICINE

## 2022-05-18 PROCEDURE — 93306 TTE W/DOPPLER COMPLETE: CPT | Performed by: INTERNAL MEDICINE

## 2022-05-18 RX ORDER — LISINOPRIL 5 MG/1
5 TABLET ORAL AT BEDTIME
Qty: 30 TABLET | Refills: 3 | Status: SHIPPED | OUTPATIENT
Start: 2022-05-18 | End: 2022-10-25 | Stop reason: DRUGHIGH

## 2022-05-18 RX ORDER — LISINOPRIL 5 MG/1
5 TABLET ORAL AT BEDTIME
COMMUNITY
End: 2022-05-18 | Stop reason: SDUPTHER

## 2022-06-17 RX ORDER — HYDROCHLOROTHIAZIDE 25 MG/1
25 TABLET ORAL DAILY
Qty: 90 TABLET | Refills: 1 | Status: SHIPPED | OUTPATIENT
Start: 2022-06-17 | End: 2023-01-23

## 2022-10-25 ENCOUNTER — TELEPHONE (OUTPATIENT)
Dept: CARDIOLOGY | Age: 82
End: 2022-10-25

## 2022-10-25 RX ORDER — LOSARTAN POTASSIUM 25 MG/1
25 TABLET ORAL DAILY
Qty: 30 TABLET | Refills: 6 | Status: SHIPPED | OUTPATIENT
Start: 2022-10-25 | End: 2023-05-26

## 2022-10-28 ENCOUNTER — LAB SERVICES (OUTPATIENT)
Dept: LAB | Age: 82
End: 2022-10-28

## 2022-10-28 ENCOUNTER — TELEPHONE (OUTPATIENT)
Dept: CARDIOLOGY | Age: 82
End: 2022-10-28

## 2022-10-28 DIAGNOSIS — E78.2 MIXED HYPERLIPIDEMIA: ICD-10-CM

## 2022-10-28 DIAGNOSIS — I70.0 ATHEROSCLEROSIS OF ABDOMINAL AORTA (CMD): ICD-10-CM

## 2022-10-28 DIAGNOSIS — I65.23 ASYMPTOMATIC CAROTID ARTERY STENOSIS, BILATERAL: ICD-10-CM

## 2022-10-28 DIAGNOSIS — I10 PRIMARY HYPERTENSION: ICD-10-CM

## 2022-10-28 DIAGNOSIS — Z86.39 HISTORY OF VITAMIN D DEFICIENCY: ICD-10-CM

## 2022-10-28 DIAGNOSIS — E55.9 HYPOVITAMINOSIS D: ICD-10-CM

## 2022-10-28 DIAGNOSIS — I49.8 ATRIAL ARRHYTHMIA: ICD-10-CM

## 2022-10-28 LAB
25(OH)D3+25(OH)D2 SERPL-MCNC: 38.5 NG/ML (ref 30–100)
ALBUMIN SERPL-MCNC: 3.8 G/DL (ref 3.6–5.1)
ALBUMIN/GLOB SERPL: 1 {RATIO} (ref 1–2.4)
ALP SERPL-CCNC: 41 UNITS/L (ref 45–117)
ALT SERPL-CCNC: 46 UNITS/L
ANION GAP SERPL CALC-SCNC: 6 MMOL/L (ref 7–19)
AST SERPL-CCNC: 36 UNITS/L
BASOPHILS # BLD: 0 K/MCL (ref 0–0.3)
BASOPHILS NFR BLD: 1 %
BILIRUB SERPL-MCNC: 0.4 MG/DL (ref 0.2–1)
BUN SERPL-MCNC: 22 MG/DL (ref 6–20)
BUN/CREAT SERPL: 28 (ref 7–25)
CALCIUM SERPL-MCNC: 9.1 MG/DL (ref 8.4–10.2)
CHLORIDE SERPL-SCNC: 105 MMOL/L (ref 97–110)
CHOLEST SERPL-MCNC: 159 MG/DL
CHOLEST/HDLC SERPL: 2.4 {RATIO}
CO2 SERPL-SCNC: 31 MMOL/L (ref 21–32)
CREAT SERPL-MCNC: 0.78 MG/DL (ref 0.51–0.95)
DEPRECATED RDW RBC: 49.1 FL (ref 39–50)
EOSINOPHIL # BLD: 0.1 K/MCL (ref 0–0.5)
EOSINOPHIL NFR BLD: 2 %
ERYTHROCYTE [DISTWIDTH] IN BLOOD: 13.6 % (ref 11–15)
FASTING DURATION TIME PATIENT: 10 HOURS (ref 0–999)
FASTING DURATION TIME PATIENT: 10 HOURS (ref 0–999)
GFR SERPLBLD BASED ON 1.73 SQ M-ARVRAT: 76 ML/MIN
GLOBULIN SER-MCNC: 3.7 G/DL (ref 2–4)
GLUCOSE SERPL-MCNC: 103 MG/DL (ref 70–99)
HCT VFR BLD CALC: 42.3 % (ref 36–46.5)
HDLC SERPL-MCNC: 67 MG/DL
HGB BLD-MCNC: 13.1 G/DL (ref 12–15.5)
IMM GRANULOCYTES # BLD AUTO: 0 K/MCL (ref 0–0.2)
IMM GRANULOCYTES # BLD: 0 %
LDLC SERPL CALC-MCNC: 80 MG/DL
LYMPHOCYTES # BLD: 2.2 K/MCL (ref 1–4)
LYMPHOCYTES NFR BLD: 42 %
MCH RBC QN AUTO: 30.1 PG (ref 26–34)
MCHC RBC AUTO-ENTMCNC: 31 G/DL (ref 32–36.5)
MCV RBC AUTO: 97.2 FL (ref 78–100)
MONOCYTES # BLD: 0.5 K/MCL (ref 0.3–0.9)
MONOCYTES NFR BLD: 10 %
NEUTROPHILS # BLD: 2.4 K/MCL (ref 1.8–7.7)
NEUTROPHILS NFR BLD: 45 %
NONHDLC SERPL-MCNC: 92 MG/DL
NRBC BLD MANUAL-RTO: 0 /100 WBC
PLATELET # BLD AUTO: 260 K/MCL (ref 140–450)
POTASSIUM SERPL-SCNC: 3.6 MMOL/L (ref 3.4–5.1)
PROT SERPL-MCNC: 7.5 G/DL (ref 6.4–8.2)
RBC # BLD: 4.35 MIL/MCL (ref 4–5.2)
SODIUM SERPL-SCNC: 138 MMOL/L (ref 135–145)
TRIGL SERPL-MCNC: 60 MG/DL
WBC # BLD: 5.3 K/MCL (ref 4.2–11)

## 2022-10-28 PROCEDURE — 85025 COMPLETE CBC W/AUTO DIFF WBC: CPT | Performed by: CLINICAL MEDICAL LABORATORY

## 2022-10-28 PROCEDURE — 80053 COMPREHEN METABOLIC PANEL: CPT | Performed by: CLINICAL MEDICAL LABORATORY

## 2022-10-28 PROCEDURE — 80061 LIPID PANEL: CPT | Performed by: CLINICAL MEDICAL LABORATORY

## 2022-10-28 PROCEDURE — 36415 COLL VENOUS BLD VENIPUNCTURE: CPT | Performed by: CLINICAL MEDICAL LABORATORY

## 2022-10-28 PROCEDURE — 82306 VITAMIN D 25 HYDROXY: CPT | Performed by: CLINICAL MEDICAL LABORATORY

## 2022-11-28 RX ORDER — ATORVASTATIN CALCIUM 20 MG/1
20 TABLET, FILM COATED ORAL DAILY
Qty: 90 TABLET | Refills: 1 | Status: SHIPPED | OUTPATIENT
Start: 2022-11-28 | End: 2023-06-08 | Stop reason: SDUPTHER

## 2023-01-23 RX ORDER — HYDROCHLOROTHIAZIDE 25 MG/1
25 TABLET ORAL DAILY
Qty: 90 TABLET | Refills: 1 | Status: SHIPPED | OUTPATIENT
Start: 2023-01-23 | End: 2023-07-20

## 2023-03-23 ENCOUNTER — EXTERNAL LAB (OUTPATIENT)
Dept: OTHER | Age: 83
End: 2023-03-23

## 2023-03-23 LAB — LAB RESULT: NORMAL

## 2023-05-10 ENCOUNTER — APPOINTMENT (OUTPATIENT)
Dept: CARDIOLOGY | Age: 83
End: 2023-05-10

## 2023-05-26 RX ORDER — LOSARTAN POTASSIUM 25 MG/1
25 TABLET ORAL DAILY
Qty: 30 TABLET | Refills: 6 | OUTPATIENT
Start: 2023-05-26

## 2023-05-26 RX ORDER — LOSARTAN POTASSIUM 25 MG/1
25 TABLET ORAL DAILY
Qty: 30 TABLET | Refills: 2 | Status: SHIPPED | OUTPATIENT
Start: 2023-05-26 | End: 2023-08-30 | Stop reason: SDUPTHER

## 2023-06-06 ENCOUNTER — OFFICE VISIT (OUTPATIENT)
Dept: OBGYN | Age: 83
End: 2023-06-06

## 2023-06-06 VITALS
WEIGHT: 109.8 LBS | SYSTOLIC BLOOD PRESSURE: 168 MMHG | HEIGHT: 60 IN | HEART RATE: 78 BPM | DIASTOLIC BLOOD PRESSURE: 67 MMHG | BODY MASS INDEX: 21.55 KG/M2

## 2023-06-06 DIAGNOSIS — N94.9 VAGINAL BURNING: Primary | ICD-10-CM

## 2023-06-06 PROBLEM — Z78.0 POSTMENOPAUSAL: Status: ACTIVE | Noted: 2023-06-06

## 2023-06-06 PROCEDURE — 99203 OFFICE O/P NEW LOW 30 MIN: CPT | Performed by: OBSTETRICS & GYNECOLOGY

## 2023-06-06 RX ORDER — GABAPENTIN 100 MG/1
100 CAPSULE ORAL 2 TIMES DAILY
COMMUNITY
Start: 2023-01-12

## 2023-06-06 ASSESSMENT — PATIENT HEALTH QUESTIONNAIRE - PHQ9
1. LITTLE INTEREST OR PLEASURE IN DOING THINGS: NOT AT ALL
CLINICAL INTERPRETATION OF PHQ2 SCORE: NO FURTHER SCREENING NEEDED
2. FEELING DOWN, DEPRESSED OR HOPELESS: NOT AT ALL
SUM OF ALL RESPONSES TO PHQ9 QUESTIONS 1 AND 2: 0
SUM OF ALL RESPONSES TO PHQ9 QUESTIONS 1 AND 2: 0

## 2023-06-07 ENCOUNTER — OFFICE VISIT (OUTPATIENT)
Dept: CARDIOLOGY | Age: 83
End: 2023-06-07

## 2023-06-07 VITALS
BODY MASS INDEX: 21.55 KG/M2 | HEIGHT: 60 IN | SYSTOLIC BLOOD PRESSURE: 147 MMHG | HEART RATE: 49 BPM | WEIGHT: 109.79 LBS | OXYGEN SATURATION: 98 % | DIASTOLIC BLOOD PRESSURE: 69 MMHG

## 2023-06-07 DIAGNOSIS — Z86.39 HISTORY OF VITAMIN D DEFICIENCY: ICD-10-CM

## 2023-06-07 DIAGNOSIS — I10 PRIMARY HYPERTENSION: ICD-10-CM

## 2023-06-07 DIAGNOSIS — E78.2 MIXED HYPERLIPIDEMIA: Primary | ICD-10-CM

## 2023-06-07 DIAGNOSIS — I49.8 ATRIAL ARRHYTHMIA: ICD-10-CM

## 2023-06-07 DIAGNOSIS — I65.23 ASYMPTOMATIC CAROTID ARTERY STENOSIS, BILATERAL: ICD-10-CM

## 2023-06-07 PROCEDURE — 99214 OFFICE O/P EST MOD 30 MIN: CPT | Performed by: INTERNAL MEDICINE

## 2023-06-07 SDOH — HEALTH STABILITY: MENTAL HEALTH: DEPRESSION SCREENING SCORE: 0

## 2023-06-07 SDOH — HEALTH STABILITY: MENTAL HEALTH: LITTLE INTEREST OR PLEASURE IN ACTIVITY?: NOT AT ALL

## 2023-06-07 SDOH — HEALTH STABILITY: MENTAL HEALTH: FEELING DOWN, DEPRESSED OR HOPELESS?: NOT AT ALL

## 2023-06-07 SDOH — HEALTH STABILITY: PHYSICAL HEALTH: ON AVERAGE, HOW MANY DAYS PER WEEK DO YOU ENGAGE IN MODERATE TO STRENUOUS EXERCISE (LIKE A BRISK WALK)?: 7 DAYS

## 2023-06-07 SDOH — HEALTH STABILITY: PHYSICAL HEALTH: ON AVERAGE, HOW MANY MINUTES DO YOU ENGAGE IN EXERCISE AT THIS LEVEL?: 20 MIN

## 2023-06-07 SDOH — HEALTH STABILITY: MENTAL HEALTH: PHQ2 INTERPRETATION: NO FURTHER SCREENING NEEDED

## 2023-06-07 ASSESSMENT — PATIENT HEALTH QUESTIONNAIRE - PHQ9: SUM OF ALL RESPONSES TO PHQ9 QUESTIONS 1 AND 2: 0

## 2023-06-08 RX ORDER — ATORVASTATIN CALCIUM 20 MG/1
20 TABLET, FILM COATED ORAL DAILY
Qty: 90 TABLET | Refills: 3 | Status: SHIPPED | OUTPATIENT
Start: 2023-06-08 | End: 2023-11-07 | Stop reason: SDUPTHER

## 2023-07-20 RX ORDER — HYDROCHLOROTHIAZIDE 25 MG/1
25 TABLET ORAL DAILY
Qty: 90 TABLET | Refills: 1 | Status: SHIPPED | OUTPATIENT
Start: 2023-07-20 | End: 2023-11-07 | Stop reason: SDUPTHER

## 2023-08-30 RX ORDER — LOSARTAN POTASSIUM 25 MG/1
25 TABLET ORAL DAILY
Qty: 90 TABLET | Refills: 2 | Status: SHIPPED | OUTPATIENT
Start: 2023-08-30 | End: 2023-11-07 | Stop reason: SDUPTHER

## 2023-10-31 ENCOUNTER — ANCILLARY PROCEDURE (OUTPATIENT)
Dept: CARDIOLOGY | Age: 83
End: 2023-10-31
Attending: INTERNAL MEDICINE

## 2023-10-31 DIAGNOSIS — I49.8 ATRIAL ARRHYTHMIA: ICD-10-CM

## 2023-10-31 DIAGNOSIS — E78.2 MIXED HYPERLIPIDEMIA: ICD-10-CM

## 2023-10-31 DIAGNOSIS — Z86.39 HISTORY OF VITAMIN D DEFICIENCY: ICD-10-CM

## 2023-10-31 DIAGNOSIS — I65.23 ASYMPTOMATIC CAROTID ARTERY STENOSIS, BILATERAL: ICD-10-CM

## 2023-10-31 DIAGNOSIS — I10 PRIMARY HYPERTENSION: ICD-10-CM

## 2023-10-31 LAB
AORTIC VALVE AREA (AVA): 0.69
AV PEAK GRADIENT (AVPG): 13
AV PEAK VELOCITY (AVPV): 1.79
AV STENOSIS SEVERITY TEXT: NORMAL
AVI LVOT PEAK GRADIENT (LVOTMG): 0.8
E WAVE DECELARATION TIME (MDT): 12.12
INTERVENTRICULAR SEPTUM IN END DIASTOLE (IVSD): 2.05
LEFT INTERNAL DIMENSION IN SYSTOLE (LVSD): 0.8
LEFT VENTRICULAR INTERNAL DIMENSION IN DIASTOLE (LVDD): 2.7
LEFT VENTRICULAR POSTERIOR WALL IN END DIASTOLE (LVPW): 4.1
LV EF: NORMAL %
LVOT VTI (LVOTVTI): 0.77
MV E TISSUE VEL LAT (MELV): 0.76
MV E TISSUE VEL MED (MESV): 5.22
MV E WAVE VEL/E TISSUE VEL MED(MSR): 5.66
MV PEAK A VELOCITY (MVPAV): 374
MV PEAK E VELOCITY (MVPEV): 0.75
RV END SYSTOLIC LONGITUDINAL STRAIN FREE WALL (RVGS): 1.8
TRICUSPID VALVE ANNULAR PEAK VELOCITY (TVAPV): 25
TRICUSPID VALVE PEAK REGURGITATION VELOCITY (TRPV): 3
TV ESTIMATED RIGHT ARTERIAL PRESSURE (RAP): 12.6

## 2023-10-31 PROCEDURE — 93306 TTE W/DOPPLER COMPLETE: CPT | Performed by: INTERNAL MEDICINE

## 2023-10-31 PROCEDURE — 76376 3D RENDER W/INTRP POSTPROCES: CPT | Performed by: INTERNAL MEDICINE

## 2023-11-07 ENCOUNTER — OFFICE VISIT (OUTPATIENT)
Dept: CARDIOLOGY | Age: 83
End: 2023-11-07

## 2023-11-07 VITALS
HEIGHT: 60 IN | HEART RATE: 55 BPM | BODY MASS INDEX: 21.21 KG/M2 | OXYGEN SATURATION: 99 % | DIASTOLIC BLOOD PRESSURE: 63 MMHG | WEIGHT: 108.03 LBS | SYSTOLIC BLOOD PRESSURE: 139 MMHG

## 2023-11-07 DIAGNOSIS — I10 PRIMARY HYPERTENSION: ICD-10-CM

## 2023-11-07 DIAGNOSIS — E78.2 MIXED HYPERLIPIDEMIA: Primary | ICD-10-CM

## 2023-11-07 DIAGNOSIS — Z86.39 HISTORY OF VITAMIN D DEFICIENCY: ICD-10-CM

## 2023-11-07 DIAGNOSIS — E55.9 HYPOVITAMINOSIS D: ICD-10-CM

## 2023-11-07 PROCEDURE — 99214 OFFICE O/P EST MOD 30 MIN: CPT | Performed by: INTERNAL MEDICINE

## 2023-11-07 RX ORDER — LOSARTAN POTASSIUM 25 MG/1
25 TABLET ORAL 2 TIMES DAILY
Qty: 180 TABLET | Refills: 1 | Status: SHIPPED | OUTPATIENT
Start: 2023-11-07

## 2023-11-07 RX ORDER — ATORVASTATIN CALCIUM 20 MG/1
20 TABLET, FILM COATED ORAL DAILY
Qty: 90 TABLET | Refills: 1 | Status: SHIPPED | OUTPATIENT
Start: 2023-11-07

## 2023-11-07 RX ORDER — HYDROCHLOROTHIAZIDE 25 MG/1
25 TABLET ORAL DAILY
Qty: 90 TABLET | Refills: 1 | Status: SHIPPED | OUTPATIENT
Start: 2023-11-07

## 2023-11-07 SDOH — HEALTH STABILITY: PHYSICAL HEALTH: ON AVERAGE, HOW MANY DAYS PER WEEK DO YOU ENGAGE IN MODERATE TO STRENUOUS EXERCISE (LIKE A BRISK WALK)?: 7 DAYS

## 2023-11-07 SDOH — HEALTH STABILITY: PHYSICAL HEALTH: ON AVERAGE, HOW MANY MINUTES DO YOU ENGAGE IN EXERCISE AT THIS LEVEL?: 30 MIN

## 2023-11-07 ASSESSMENT — PATIENT HEALTH QUESTIONNAIRE - PHQ9
SUM OF ALL RESPONSES TO PHQ9 QUESTIONS 1 AND 2: 0
2. FEELING DOWN, DEPRESSED OR HOPELESS: NOT AT ALL
SUM OF ALL RESPONSES TO PHQ9 QUESTIONS 1 AND 2: 0
CLINICAL INTERPRETATION OF PHQ2 SCORE: NO FURTHER SCREENING NEEDED
1. LITTLE INTEREST OR PLEASURE IN DOING THINGS: NOT AT ALL

## 2024-01-15 RX ORDER — HYDROCHLOROTHIAZIDE 25 MG/1
25 TABLET ORAL DAILY
Qty: 90 TABLET | Refills: 1 | Status: SHIPPED | OUTPATIENT
Start: 2024-01-15

## 2024-04-08 NOTE — OPERATIVE REPORT
[facility]  Colonoscopy Report      Nimesh De León Patient Status:  Hospital Outpatient Surgery    1940 MRN [id number]   Location [id number] [place] [address] Attending Mirian Bernstein MD       DATE OF OPERATION: 10/8/2021     PREO PROGRESS NOTE    Reason for Visit: Medication Management and Therapy  Patient accompanied by: mother (Romelia)  Total time spent: 30 minutes, with at least 16 minutes spent in psychotherapy  Identifying information: Kike Johnson is a 10 year old female with current working diagnosis of ADHD who presents to clinic for follow-up.     BRIEF HISTORY OF PRESENT ILLNESS:    Writer met with the patient and mother together and spoke with both individually to gather history.    ADHD:  Current regimen consisting of Metadate CD 20 mg every morning (~6:30-7am).  Kike is a 2nd grader at [employer]. She is usually in school from 8:40 AM to 3:30 PM.  Typically has before and after school .  According to mom they are not currently doing the booster midday.  Teachers overall have not reported any concerns that the medication is wearing off during the school day.  She is still getting pulled out for reading and math.  They will likely put her in a co-taught classroom next year for math.  She also does tutoring once a week outside of school for reading. Her appetite and sleep have been stable.    Historically, there have been concerns about impulsivity, distractibility, struggles with moving from one task to the next.  Mom has also observed challenges with sustaining attention to tasks, following directions and noticing some these symptoms since she was 4 years old.  At times, Kiek will zone out.  Mom sees fidgety behaviors, intrusiveness.      Emotional/Behavioral:  Writer met with the patient to review progress in the interim. Kike denies any depression, anhedonia, feelings of hopelessness or worthlessness. Mom indicated that Kike gets upset or frustrated easily and can be very relentless if she wants something.  These outbursts typically happen about once a week but can be short-lived.  There are some weeks where she has no issues at all.     Historically, parents saw an increase in behaviors following COVID-19  pandemic restrictions.  Mom began noticing periods where Kike will get easily frustrated, angry and have explosive outbursts.  Mom describes Kike to be short tempered from young age but not experiencing significant aggressive behaviors pandemic. During the outburst, patient would become physical, hitting, attempting to bite and would struggle with calming down.  Parents tried to focus on positives and recently began a behavioral plan where she would earn marbles which eventually she could turn in for bigger prizes/rewards.  Since implementing behavior system, mom has seen improvement with fewer, severe tantrums and more age-appropriate outbursts.  For a while, mom felt as though parents were walking on eggshells, not knowing when she would be \"set off\".  No prior history of aggression preceding COVID-19 pandemic.  Triggers oftentimes for outbursts include being told no, limit setting or if she becomes frustrated if something does not go right.  Frequency of outbursts was about 2-3 times a week and lately it has been less frequent.  Duration of episodes could be anywhere from a few minutes to 20 minutes.  Mom does not see chronically irritable mood and in general finds that she is a happy child.        Anxiety: Mom notices situational anxiety or worries that pop up.  Kike denies excessive worries, nervousness or feeling restless/on edge most days. Patient denies difficulty with managing worries, panic symptoms or trouble relaxing. Denies any acute stressors.     Historically, mom has seen concerns for anxiety.  Patient's father is a retired  and now working in security.  Patient will at times worry about dad leaving for work given recent protests and riots.  She also sometimes worries when dad travels for work. She has reported to mom worrying about coyotes.  Mom also sees Kike become anxious when she is the center of attention.  Mom does not see anxious behaviors or worries occurring most days  semisolid stool not suctioned, but < 90% mucosa seen; 5 - inadequate - repeat prep needed)     The colon withdrawal time was 8 minutes. RECOMMENDATIONS:    1. High fiber diet. 2. In light of her age, she does not need another colonoscopy. nor does mom feel it is something that is causing significant impairment at this time.      Sleep: Patient goes to bed around 8pm and is asleep by a few min. Awake by 5:45am. Patient denies taking any naps during the day.    Therapy: Part of the session was spent providing supportive psychotherapy. Reviewed progress in the interim between visits. Discussed recent stressors and processed ways of continuing to use healthy adaptive skills to manage. A portion of the session focused on assessment of factors contributing to current presentation.  Kike was coached in a variety of coping strategies and the appropriate expression of thoughts, feelings, needs and steps to problem solving collaboratively.     Writer met with parent to review progress in the interm.  Mom noted that patient's therapist left the practice.  Currently, patient is not seeing anyone. A portion of the session focused on psychoeducation about Kike's condition, support, and discussion of effective parenting strategies. Writer provided coaching/modeling and reinforced effective parenting strategies, behavioral interventions for home and collaborative problem solving skills. Mom was educated regarding factors impacting the Kike's behavioral and emotional functioning.  Current medications:   Current Outpatient Medications   Medication Sig    methylphenidate (METADATE CD) 20 MG ER (CD) capsule Take 1 capsule by mouth every morning.    methylpheniDATE (Ritalin) 10 MG tablet Take 1 tablet after lunch around noon    prednisoLONE sod-phos (ORAPRED) 15 MG/5ML oral solution Start orapred 15 mg/5ml as follows: 33 mg (11 ml) po qd x 5d.    albuterol 108 (90 Base) MCG/ACT inhaler Inhale 2 puffs into the lungs every 4 hours as needed for Shortness of Breath, Wheezing or Other (Cough).    fluticasone-salmeterol (ADVAIR HFA) 115-21 MCG/ACT inhaler Inhale 1 puff into the lungs in the morning and 1 puff in the evening. May increase to 2 puffs twice daily for colds  and illnesses.    ipratropium (ATROVENT) 0.06 % nasal spray Spray 1 spray in each nostril every 6 hours as needed for Rhinitis.    Spacer/Aero-Hold Chamber Mask Misc Please dispense the spacer and mask best covered by insurance.    ibuprofen (MOTRIN,ADVIL) 100 MG/5ML suspension Take 150 mg by mouth. - Oral    triamcinolone (ARISTOCORT) 0.1 % ointment Apply twice daily as needed up to 2 weeks consecutively in non-sensitive, affected skin areas.     No current facility-administered medications for this visit.     Patient taking medications exactly as prescribed per previous visit: Yes  Side effects: No  Vitals: There were no vitals taken for this visit.  Review of Systems: All systems reviewed and negative with exception of below   - Psychiatric: less impulsive, oppositional, no anxiety or depression, no psychosis or stephanie   - Neurological: no headaches or weakness   - Cardiac: no chest pain, palpitations, or shortness of breath    Psychiatric History:  Pertinent history reviewed with patient and parents with no clinically significant changes in the interim.   Previous diagnosis: Per mother, patient has no prior psychiatric history of note  Neuropsychological testing: none  Previous outpatient treatment:             - Therapy: Lissa Rocha             - Medication management: none  Previous medication trials:              - methylphenidate IR chewable->small improvement though more peaks and valleys->switched to metadate   - Metadate 20mg: tolerated well with less rebound->added Ritalin IR 10mg for after lunch  Previous hospitalizations:             - IPU: none             - PHP/IOP: none  Previous suicide attempts/self-injury: none  Current outpatient providers:             - Therapy:  none    Medical and Developmental History:  Pertinent history reviewed with patient and parents with no clinically significant changes in the interim.   DEVELOPMENTAL HISTORY  Prenatal:   subchorionic hematoma->was on bed rest from  12 weeks onward  :   Cesarian delivery: emergency due to drop in fetal HR  Birth weight: 8lbs 11oz  Full-term  Developmental Milestones:   Age at walkin months  Age at first words: 12  months  Temperament:   Average/Typical  Typical Discipline: taking away privileges     MEDICAL HISTORY  - Allergies: NKDA  - Seasonal allergies  - History of recurrent ear infections  - Head injury: none, Loss of consciousness:  No  - Seizures: none  - Medical Hospitalizations (dates, locations, reasons): none  - Date of last completed physical exam:    - Immunizations up to date:  Yes  - Past surgical history:  Ear tubes, adenoid removal     - Pediatrician: Dr. Tang      MEDICAL PROBLEMS - RELATIVES:  - No history of heart disease, early/sudden death, tics, headaches, diabetes, thyroid problems or seizures reported     PSYCHIATRIC PROBLEMS - RELATIVES:  - ADHD: mom wonders about dad (not diagnosed), maternal aunt  - Anxiety: dad (takes medication), paternal aunt, paternal cousin  - Substance abuse: none reported  - Suicide completion: none reported    Social History:  Pertinent history reviewed with patient and parents with no clinically significant changes in the interim.   Family Constellation  Patient lives at home with mom. Parents  in .  Dad passed away in 2022 from esophageal cancer.     Mother: Birth, Name: Romelia Johnson, Age: 41 years  Father:  Birth, Name: Lalo Alex, Age:      Legal Guardianship of Patient:  Biological Mother  Biological Father  Highest Education Level  Father: Bachelors in law enforcement  Mother: Masters in education and administration  Occupation:  Father: retired , worked in security/  Mother:   Mandaeism Preference:  Anabaptism  DCFS Involvement: No  Juvenile Court Involvement: No     SCHOOL HISTORY  Current school: [employer]  Grade level: 3rd, Failed previous grade: No  Other school history:     Attendance:  good  Behavior:  good  - Grades: Average, struggles with reading  - Special education: pulled out for reading  - Honors/accelerated program: No  - IEP/504 plan: IEP     INTERESTS  Draw, color, paint, legos    MENTAL STATUS EXAM:    Behavioral Observations: alert, cooperative, appropriate eye contact, normal gait and station  Appearance: appropriate hygiene and grooming, casually dressed, average size and appears stated age  Separation from escort: Normal  Manner of relating to examiner: cooperative  Psychomotor activity:   no abnormal movements including tics, tremors, stereotypies, or catatonia  Muscle strength/tone: none  Speech: appropriate rate, rhythym, and volume  and no perseveration or paucity of language  Receptive Language: Normal  Expressive Language: Normal    Mood: \"good\"  Affective expression: appeared content  Thought process: linear, no thought blocking, looseness of associations, tangential or circumstantial thinking or derailments  Though content: no delusions, obsessions/ruminations, phobias or preoccupations  Suicidal/Homicidal ideation: Absent  Perceptions: no auditory or visual hallucinations, no tactile or olfactory hallucinations, depersonalization or derealization  Orientation: person/place/time/situation  Attention/Concentration: fair  Memory: appropriate  Impulse Control: fair  Judgment: fair  Insight: age appropriate  Intelligence/Fund of knowledge (clinical estimate): average    SCALES/RECORDS REVIEWED: chart review    ASSESSMENT: Kkie Johnson is a 10 year old female who presents to clinic for follow-up.     DIAGNOSIS:   Attention-Deficit/Hyperactivity Disorder, Combined Presentation  Unspecified Disruptive, Impulse-Control, and Conduct Disorder     THERAPY MODALITY:   Supportive Therapy  Psychoeducational  Parent Management Training  TREATMENT PLAN:   - Therapy: Recommend continued engagement in psychotherapy services with emphasis on improving interpersonal  skills/communication, behavioral management training as well as coping skills to manage affect.     - Continue with speech therapy thru school.   - Medication management:    - ADHD: Continue Metadate CD 20mg QAM, Ritalin IR 10mg after school as needed. Writer reviewed the [place] Prescription Monitoring Program website prior to prescribing medication. Office visit completed 4/8/24.  - Writer encouraged parent to take patient to ER or call 911 if patient endorses any suicidal or homicidal ideation, expresses auditory or visual hallucination or in case of any other emergency.  Patient and parents agree to call or contact writer with any questions, concerns, or worsening in mood or behavior.  - Provided psychoeducation regarding current working diagnosis and available treatment options and answered any questions brought forth. Supportive listening and reassurance provided.   - Return to clinic: 3 months  SCALES/RECORDS ORDERED: Dawn (teacher), RUTH (parent/child)    Electronically signed by:   Gray Ruvalcaba MD

## 2024-04-18 RX ORDER — LOSARTAN POTASSIUM 25 MG/1
25 TABLET ORAL 2 TIMES DAILY
Qty: 180 TABLET | Refills: 0 | Status: SHIPPED | OUTPATIENT
Start: 2024-04-18

## 2024-05-07 ENCOUNTER — APPOINTMENT (OUTPATIENT)
Dept: CARDIOLOGY | Age: 84
End: 2024-05-07
Attending: INTERNAL MEDICINE

## 2024-05-07 DIAGNOSIS — I10 PRIMARY HYPERTENSION: ICD-10-CM

## 2024-05-07 DIAGNOSIS — Z86.39 HISTORY OF VITAMIN D DEFICIENCY: ICD-10-CM

## 2024-05-07 DIAGNOSIS — E78.2 MIXED HYPERLIPIDEMIA: ICD-10-CM

## 2024-05-07 LAB
AORTIC VALVE AREA (AVA): 0.61
ASCENDING AORTA (AAD): 3
AV PEAK GRADIENT (AVPG): 21
AV PEAK VELOCITY (AVPV): 2.29
AV STENOSIS SEVERITY TEXT: NORMAL
AVI LVOT PEAK GRADIENT (LVOTMG): 0.7
E WAVE DECELARATION TIME (MDT): 11.22
INTERVENTRICULAR SEPTUM IN END DIASTOLE (IVSD): 2.6
LEFT INTERNAL DIMENSION IN SYSTOLE (LVSD): 0.7
LEFT VENTRICULAR INTERNAL DIMENSION IN DIASTOLE (LVDD): 2.4
LEFT VENTRICULAR POSTERIOR WALL IN END DIASTOLE (LVPW): 4.3
LV EF: NORMAL %
LVOT VTI (LVOTVTI): 0.9
MV E TISSUE VEL MED (MESV): 5.3
MV E WAVE VEL/E TISSUE VEL MED(MSR): 5.4
MV PEAK A VELOCITY (MVPAV): 296
MV PEAK E VELOCITY (MVPEV): 0.91
RV END SYSTOLIC LONGITUDINAL STRAIN FREE WALL (RVGS): 2
TRICUSPID VALVE ANNULAR PEAK VELOCITY (TVAPV): 30
TRICUSPID VALVE PEAK REGURGITATION VELOCITY (TRPV): 3.2
TV ESTIMATED RIGHT ARTERIAL PRESSURE (RAP): 15

## 2024-05-07 PROCEDURE — 93306 TTE W/DOPPLER COMPLETE: CPT | Performed by: INTERNAL MEDICINE

## 2024-05-07 PROCEDURE — 93356 MYOCRD STRAIN IMG SPCKL TRCK: CPT | Performed by: INTERNAL MEDICINE

## 2024-05-07 PROCEDURE — 76376 3D RENDER W/INTRP POSTPROCES: CPT | Performed by: INTERNAL MEDICINE

## 2024-05-10 ENCOUNTER — LAB SERVICES (OUTPATIENT)
Dept: LAB | Age: 84
End: 2024-05-10

## 2024-05-10 DIAGNOSIS — Z86.39 HISTORY OF VITAMIN D DEFICIENCY: ICD-10-CM

## 2024-05-10 DIAGNOSIS — I10 PRIMARY HYPERTENSION: ICD-10-CM

## 2024-05-10 DIAGNOSIS — E55.9 HYPOVITAMINOSIS D: ICD-10-CM

## 2024-05-10 DIAGNOSIS — E78.2 MIXED HYPERLIPIDEMIA: ICD-10-CM

## 2024-05-10 LAB
25(OH)D3+25(OH)D2 SERPL-MCNC: 34.5 NG/ML (ref 30–100)
ALBUMIN SERPL-MCNC: 3.5 G/DL (ref 3.6–5.1)
ALBUMIN/GLOB SERPL: 1 {RATIO} (ref 1–2.4)
ALP SERPL-CCNC: 45 UNITS/L (ref 45–117)
ALT SERPL-CCNC: 25 UNITS/L
ANION GAP SERPL CALC-SCNC: 5 MMOL/L (ref 7–19)
AST SERPL-CCNC: 22 UNITS/L
BASOPHILS # BLD: 0 K/MCL (ref 0–0.3)
BASOPHILS NFR BLD: 1 %
BILIRUB SERPL-MCNC: 0.4 MG/DL (ref 0.2–1)
BUN SERPL-MCNC: 18 MG/DL (ref 6–20)
BUN/CREAT SERPL: 21 (ref 7–25)
CALCIUM SERPL-MCNC: 8.9 MG/DL (ref 8.4–10.2)
CHLORIDE SERPL-SCNC: 108 MMOL/L (ref 97–110)
CHOLEST SERPL-MCNC: 148 MG/DL
CHOLEST/HDLC SERPL: 2.2 {RATIO}
CO2 SERPL-SCNC: 28 MMOL/L (ref 21–32)
CREAT SERPL-MCNC: 0.87 MG/DL (ref 0.51–0.95)
DEPRECATED RDW RBC: 48.4 FL (ref 39–50)
EGFRCR SERPLBLD CKD-EPI 2021: 66 ML/MIN/{1.73_M2}
EOSINOPHIL # BLD: 0.1 K/MCL (ref 0–0.5)
EOSINOPHIL NFR BLD: 2 %
ERYTHROCYTE [DISTWIDTH] IN BLOOD: 13.7 % (ref 11–15)
FASTING DURATION TIME PATIENT: ABNORMAL H
GLOBULIN SER-MCNC: 3.5 G/DL (ref 2–4)
GLUCOSE SERPL-MCNC: 107 MG/DL (ref 70–99)
HCT VFR BLD CALC: 39 % (ref 36–46.5)
HDLC SERPL-MCNC: 68 MG/DL
HGB BLD-MCNC: 12.4 G/DL (ref 12–15.5)
IMM GRANULOCYTES # BLD AUTO: 0 K/MCL (ref 0–0.2)
IMM GRANULOCYTES # BLD: 0 %
LDLC SERPL CALC-MCNC: 71 MG/DL
LYMPHOCYTES # BLD: 1.5 K/MCL (ref 1–4)
LYMPHOCYTES NFR BLD: 36 %
MCH RBC QN AUTO: 30.3 PG (ref 26–34)
MCHC RBC AUTO-ENTMCNC: 31.8 G/DL (ref 32–36.5)
MCV RBC AUTO: 95.4 FL (ref 78–100)
MONOCYTES # BLD: 0.4 K/MCL (ref 0.3–0.9)
MONOCYTES NFR BLD: 10 %
NEUTROPHILS # BLD: 2.2 K/MCL (ref 1.8–7.7)
NEUTROPHILS NFR BLD: 51 %
NONHDLC SERPL-MCNC: 80 MG/DL
NRBC BLD MANUAL-RTO: 0 /100 WBC
PLATELET # BLD AUTO: 246 K/MCL (ref 140–450)
POTASSIUM SERPL-SCNC: 3.8 MMOL/L (ref 3.4–5.1)
PROT SERPL-MCNC: 7 G/DL (ref 6.4–8.2)
RBC # BLD: 4.09 MIL/MCL (ref 4–5.2)
SODIUM SERPL-SCNC: 137 MMOL/L (ref 135–145)
TRIGL SERPL-MCNC: 46 MG/DL
WBC # BLD: 4.3 K/MCL (ref 4.2–11)

## 2024-05-10 PROCEDURE — 80061 LIPID PANEL: CPT | Performed by: CLINICAL MEDICAL LABORATORY

## 2024-05-10 PROCEDURE — 36415 COLL VENOUS BLD VENIPUNCTURE: CPT | Performed by: CLINICAL MEDICAL LABORATORY

## 2024-05-10 PROCEDURE — 82306 VITAMIN D 25 HYDROXY: CPT | Performed by: CLINICAL MEDICAL LABORATORY

## 2024-05-10 PROCEDURE — 80053 COMPREHEN METABOLIC PANEL: CPT | Performed by: CLINICAL MEDICAL LABORATORY

## 2024-05-10 PROCEDURE — 85025 COMPLETE CBC W/AUTO DIFF WBC: CPT | Performed by: CLINICAL MEDICAL LABORATORY

## 2024-05-22 ENCOUNTER — APPOINTMENT (OUTPATIENT)
Dept: CARDIOLOGY | Age: 84
End: 2024-05-22

## 2024-05-22 VITALS
OXYGEN SATURATION: 99 % | HEART RATE: 73 BPM | HEIGHT: 60 IN | SYSTOLIC BLOOD PRESSURE: 154 MMHG | WEIGHT: 110.89 LBS | BODY MASS INDEX: 21.77 KG/M2 | DIASTOLIC BLOOD PRESSURE: 78 MMHG

## 2024-05-22 DIAGNOSIS — E78.2 MIXED HYPERLIPIDEMIA: ICD-10-CM

## 2024-05-22 DIAGNOSIS — I65.23 ASYMPTOMATIC CAROTID ARTERY STENOSIS, BILATERAL: Primary | ICD-10-CM

## 2024-05-22 DIAGNOSIS — Z86.39 HISTORY OF VITAMIN D DEFICIENCY: ICD-10-CM

## 2024-05-22 DIAGNOSIS — I10 PRIMARY HYPERTENSION: ICD-10-CM

## 2024-05-22 DIAGNOSIS — E55.9 HYPOVITAMINOSIS D: ICD-10-CM

## 2024-05-22 SDOH — HEALTH STABILITY: MENTAL HEALTH: DEPRESSION SCREENING SCORE: 0

## 2024-05-22 SDOH — HEALTH STABILITY: MENTAL HEALTH: LITTLE INTEREST OR PLEASURE IN ACTIVITY?: NOT AT ALL

## 2024-05-22 SDOH — HEALTH STABILITY: PHYSICAL HEALTH: ON AVERAGE, HOW MANY DAYS PER WEEK DO YOU ENGAGE IN MODERATE TO STRENUOUS EXERCISE (LIKE A BRISK WALK)?: 0 DAYS

## 2024-05-22 SDOH — HEALTH STABILITY: PHYSICAL HEALTH: ON AVERAGE, HOW MANY MINUTES DO YOU ENGAGE IN EXERCISE AT THIS LEVEL?: 0 MIN

## 2024-05-22 SDOH — HEALTH STABILITY: MENTAL HEALTH: FEELING DOWN, DEPRESSED OR HOPELESS?: NOT AT ALL

## 2024-05-22 SDOH — HEALTH STABILITY: MENTAL HEALTH: PHQ2 INTERPRETATION: NO FURTHER SCREENING NEEDED

## 2024-05-22 ASSESSMENT — PATIENT HEALTH QUESTIONNAIRE - PHQ9: SUM OF ALL RESPONSES TO PHQ9 QUESTIONS 1 AND 2: 0

## 2024-08-12 RX ORDER — HYDROCHLOROTHIAZIDE 25 MG/1
25 TABLET ORAL DAILY
Qty: 90 TABLET | Refills: 1 | Status: SHIPPED | OUTPATIENT
Start: 2024-08-12

## 2024-12-12 DIAGNOSIS — I65.23 ASYMPTOMATIC CAROTID ARTERY STENOSIS, BILATERAL: Primary | ICD-10-CM

## 2024-12-12 DIAGNOSIS — I10 PRIMARY HYPERTENSION: ICD-10-CM

## 2024-12-12 DIAGNOSIS — E78.2 MIXED HYPERLIPIDEMIA: ICD-10-CM

## 2024-12-12 RX ORDER — ATORVASTATIN CALCIUM 20 MG/1
20 TABLET, FILM COATED ORAL DAILY
Qty: 90 TABLET | Refills: 3 | Status: SHIPPED | OUTPATIENT
Start: 2024-12-12

## 2024-12-12 RX ORDER — LOSARTAN POTASSIUM 25 MG/1
25 TABLET ORAL 2 TIMES DAILY
Qty: 180 TABLET | Refills: 3 | Status: SHIPPED | OUTPATIENT
Start: 2024-12-12

## 2025-02-12 RX ORDER — HYDROCHLOROTHIAZIDE 25 MG/1
25 TABLET ORAL DAILY
Qty: 90 TABLET | Refills: 1 | Status: SHIPPED | OUTPATIENT
Start: 2025-02-12

## 2025-02-12 RX ORDER — HYDROCHLOROTHIAZIDE 25 MG/1
25 TABLET ORAL DAILY
Qty: 90 TABLET | Refills: 1 | OUTPATIENT
Start: 2025-02-12

## 2025-05-14 ENCOUNTER — RESULTS FOLLOW-UP (OUTPATIENT)
Dept: CARDIOLOGY | Age: 85
End: 2025-05-14

## 2025-05-14 ENCOUNTER — LAB SERVICES (OUTPATIENT)
Dept: LAB | Age: 85
End: 2025-05-14

## 2025-05-14 DIAGNOSIS — I65.23 ASYMPTOMATIC CAROTID ARTERY STENOSIS, BILATERAL: ICD-10-CM

## 2025-05-14 DIAGNOSIS — Z86.39 HISTORY OF VITAMIN D DEFICIENCY: ICD-10-CM

## 2025-05-14 DIAGNOSIS — E55.9 HYPOVITAMINOSIS D: ICD-10-CM

## 2025-05-14 DIAGNOSIS — E87.6 HYPOKALEMIA: Primary | ICD-10-CM

## 2025-05-14 DIAGNOSIS — E78.2 MIXED HYPERLIPIDEMIA: ICD-10-CM

## 2025-05-14 DIAGNOSIS — I10 PRIMARY HYPERTENSION: ICD-10-CM

## 2025-05-14 LAB
25(OH)D3+25(OH)D2 SERPL-MCNC: 49.8 NG/ML (ref 30–100)
ALBUMIN SERPL-MCNC: 3.5 G/DL (ref 3.4–5)
ALBUMIN/GLOB SERPL: 1 {RATIO} (ref 1–2.4)
ALP SERPL-CCNC: 38 UNITS/L (ref 45–117)
ALT SERPL-CCNC: 18 UNITS/L
ANION GAP SERPL CALC-SCNC: 7 MMOL/L (ref 7–19)
AST SERPL-CCNC: 21 UNITS/L
BASOPHILS # BLD: 0 K/MCL (ref 0–0.3)
BASOPHILS NFR BLD: 1 %
BILIRUB SERPL-MCNC: 0.4 MG/DL (ref 0.2–1)
BUN SERPL-MCNC: 13 MG/DL (ref 6–20)
BUN/CREAT SERPL: 18 (ref 7–25)
CALCIUM SERPL-MCNC: 8.8 MG/DL (ref 8.4–10.2)
CHLORIDE SERPL-SCNC: 105 MMOL/L (ref 97–110)
CHOLEST SERPL-MCNC: 165 MG/DL
CHOLEST/HDLC SERPL: 2.4 {RATIO}
CO2 SERPL-SCNC: 28 MMOL/L (ref 21–32)
CREAT SERPL-MCNC: 0.73 MG/DL (ref 0.51–0.95)
DEPRECATED RDW RBC: 48.5 FL (ref 39–50)
EGFRCR SERPLBLD CKD-EPI 2021: 82 ML/MIN/{1.73_M2}
EOSINOPHIL # BLD: 0.1 K/MCL (ref 0–0.5)
EOSINOPHIL NFR BLD: 2 %
ERYTHROCYTE [DISTWIDTH] IN BLOOD: 13.7 % (ref 11–15)
FASTING DURATION TIME PATIENT: 11 HOURS (ref 0–999)
GLOBULIN SER-MCNC: 3.4 G/DL (ref 2–4)
GLUCOSE SERPL-MCNC: 100 MG/DL (ref 70–99)
HBA1C MFR BLD: 5.7 % (ref 4.5–5.6)
HCT VFR BLD CALC: 38.5 % (ref 36–46.5)
HDLC SERPL-MCNC: 70 MG/DL
HGB BLD-MCNC: 12.3 G/DL (ref 12–15.5)
IMM GRANULOCYTES # BLD AUTO: 0 K/MCL (ref 0–0.2)
IMM GRANULOCYTES # BLD: 0 %
LDLC SERPL CALC-MCNC: 79 MG/DL
LYMPHOCYTES # BLD: 1.5 K/MCL (ref 1–4)
LYMPHOCYTES NFR BLD: 39 %
MCH RBC QN AUTO: 30.4 PG (ref 26–34)
MCHC RBC AUTO-ENTMCNC: 31.9 G/DL (ref 32–36.5)
MCV RBC AUTO: 95.3 FL (ref 78–100)
MONOCYTES # BLD: 0.4 K/MCL (ref 0.3–0.9)
MONOCYTES NFR BLD: 11 %
NEUTROPHILS # BLD: 1.9 K/MCL (ref 1.8–7.7)
NEUTROPHILS NFR BLD: 47 %
NONHDLC SERPL-MCNC: 95 MG/DL
NRBC BLD MANUAL-RTO: 0 /100 WBC
PLATELET # BLD AUTO: 229 K/MCL (ref 140–450)
POTASSIUM SERPL-SCNC: 3 MMOL/L (ref 3.4–5.1)
PROT SERPL-MCNC: 6.9 G/DL (ref 6.4–8.2)
RBC # BLD: 4.04 MIL/MCL (ref 4–5.2)
SODIUM SERPL-SCNC: 137 MMOL/L (ref 135–145)
TRIGL SERPL-MCNC: 81 MG/DL
WBC # BLD: 4 K/MCL (ref 4.2–11)

## 2025-05-14 PROCEDURE — 80061 LIPID PANEL: CPT | Performed by: CLINICAL MEDICAL LABORATORY

## 2025-05-14 PROCEDURE — 82306 VITAMIN D 25 HYDROXY: CPT | Performed by: CLINICAL MEDICAL LABORATORY

## 2025-05-14 PROCEDURE — 83036 HEMOGLOBIN GLYCOSYLATED A1C: CPT | Performed by: CLINICAL MEDICAL LABORATORY

## 2025-05-14 PROCEDURE — 85025 COMPLETE CBC W/AUTO DIFF WBC: CPT | Performed by: CLINICAL MEDICAL LABORATORY

## 2025-05-14 PROCEDURE — 36415 COLL VENOUS BLD VENIPUNCTURE: CPT | Performed by: CLINICAL MEDICAL LABORATORY

## 2025-05-14 PROCEDURE — 80053 COMPREHEN METABOLIC PANEL: CPT | Performed by: CLINICAL MEDICAL LABORATORY

## 2025-05-14 RX ORDER — POTASSIUM CHLORIDE 1500 MG/1
20 TABLET, EXTENDED RELEASE ORAL DAILY
Qty: 30 TABLET | Refills: 3 | Status: SHIPPED | OUTPATIENT
Start: 2025-05-14

## 2025-05-14 RX ORDER — POTASSIUM CHLORIDE 1500 MG/1
20 TABLET, EXTENDED RELEASE ORAL DAILY
COMMUNITY
End: 2025-05-14 | Stop reason: SDUPTHER

## 2025-05-16 ENCOUNTER — TELEPHONE (OUTPATIENT)
Dept: CARDIOLOGY | Age: 85
End: 2025-05-16

## 2025-05-19 ENCOUNTER — APPOINTMENT (OUTPATIENT)
Dept: CARDIOLOGY | Age: 85
End: 2025-05-19
Attending: INTERNAL MEDICINE

## 2025-05-19 DIAGNOSIS — I10 PRIMARY HYPERTENSION: ICD-10-CM

## 2025-05-19 DIAGNOSIS — I65.23 ASYMPTOMATIC CAROTID ARTERY STENOSIS, BILATERAL: ICD-10-CM

## 2025-05-19 DIAGNOSIS — E78.2 MIXED HYPERLIPIDEMIA: ICD-10-CM

## 2025-05-19 DIAGNOSIS — Z86.39 HISTORY OF VITAMIN D DEFICIENCY: ICD-10-CM

## 2025-05-19 PROCEDURE — 93880 EXTRACRANIAL BILAT STUDY: CPT | Performed by: INTERNAL MEDICINE

## 2025-05-21 ENCOUNTER — LAB SERVICES (OUTPATIENT)
Dept: LAB | Age: 85
End: 2025-05-21

## 2025-05-21 ENCOUNTER — RESULTS FOLLOW-UP (OUTPATIENT)
Dept: CARDIOLOGY | Age: 85
End: 2025-05-21

## 2025-05-21 DIAGNOSIS — E87.6 HYPOKALEMIA: ICD-10-CM

## 2025-05-21 LAB — POTASSIUM SERPL-SCNC: 4.2 MMOL/L (ref 3.4–5.1)

## 2025-05-21 PROCEDURE — 36415 COLL VENOUS BLD VENIPUNCTURE: CPT | Performed by: CLINICAL MEDICAL LABORATORY

## 2025-05-21 PROCEDURE — 84132 ASSAY OF SERUM POTASSIUM: CPT | Performed by: CLINICAL MEDICAL LABORATORY

## 2025-05-22 ENCOUNTER — APPOINTMENT (OUTPATIENT)
Dept: CARDIOLOGY | Age: 85
End: 2025-05-22

## 2025-05-22 VITALS
HEART RATE: 61 BPM | BODY MASS INDEX: 21.64 KG/M2 | HEIGHT: 60 IN | OXYGEN SATURATION: 98 % | DIASTOLIC BLOOD PRESSURE: 64 MMHG | WEIGHT: 110.23 LBS | SYSTOLIC BLOOD PRESSURE: 134 MMHG

## 2025-05-22 DIAGNOSIS — E78.2 MIXED HYPERLIPIDEMIA: ICD-10-CM

## 2025-05-22 DIAGNOSIS — I49.8 ATRIAL ARRHYTHMIA: Primary | ICD-10-CM

## 2025-05-22 DIAGNOSIS — E55.9 HYPOVITAMINOSIS D: ICD-10-CM

## 2025-05-22 DIAGNOSIS — I10 PRIMARY HYPERTENSION: ICD-10-CM

## 2025-07-01 ENCOUNTER — APPOINTMENT (OUTPATIENT)
Dept: GENERAL RADIOLOGY | Facility: HOSPITAL | Age: 85
End: 2025-07-01
Attending: EMERGENCY MEDICINE
Payer: MEDICARE

## 2025-07-01 ENCOUNTER — HOSPITAL ENCOUNTER (INPATIENT)
Facility: HOSPITAL | Age: 85
LOS: 1 days | Discharge: HOME | End: 2025-07-04
Attending: EMERGENCY MEDICINE | Admitting: INTERNAL MEDICINE
Payer: MEDICARE

## 2025-07-01 DIAGNOSIS — M54.50 BACK PAIN, LUMBOSACRAL: Primary | ICD-10-CM

## 2025-07-01 PROCEDURE — 96374 THER/PROPH/DIAG INJ IV PUSH: CPT

## 2025-07-01 PROCEDURE — 80053 COMPREHEN METABOLIC PANEL: CPT | Performed by: EMERGENCY MEDICINE

## 2025-07-01 PROCEDURE — 72110 X-RAY EXAM L-2 SPINE 4/>VWS: CPT | Performed by: EMERGENCY MEDICINE

## 2025-07-01 PROCEDURE — 99285 EMERGENCY DEPT VISIT HI MDM: CPT

## 2025-07-01 PROCEDURE — 85025 COMPLETE CBC W/AUTO DIFF WBC: CPT | Performed by: EMERGENCY MEDICINE

## 2025-07-01 PROCEDURE — 96375 TX/PRO/DX INJ NEW DRUG ADDON: CPT

## 2025-07-01 RX ORDER — ONDANSETRON 2 MG/ML
4 INJECTION INTRAMUSCULAR; INTRAVENOUS ONCE
Status: COMPLETED | OUTPATIENT
Start: 2025-07-01 | End: 2025-07-01

## 2025-07-01 RX ORDER — MORPHINE SULFATE 2 MG/ML
2 INJECTION, SOLUTION INTRAMUSCULAR; INTRAVENOUS ONCE
Status: COMPLETED | OUTPATIENT
Start: 2025-07-01 | End: 2025-07-01

## 2025-07-02 PROBLEM — M54.50 BACK PAIN, LUMBOSACRAL: Status: ACTIVE | Noted: 2025-07-02

## 2025-07-02 LAB
ALBUMIN SERPL-MCNC: 4.2 G/DL (ref 3.2–4.8)
ALBUMIN/GLOB SERPL: 1.6 {RATIO} (ref 1–2)
ALP LIVER SERPL-CCNC: 54 U/L (ref 55–142)
ALT SERPL-CCNC: 29 U/L (ref 10–49)
ANION GAP SERPL CALC-SCNC: 8 MMOL/L (ref 0–18)
ANION GAP SERPL CALC-SCNC: 8 MMOL/L (ref 0–18)
AST SERPL-CCNC: 28 U/L (ref ?–34)
BASOPHILS # BLD AUTO: 0.02 X10(3) UL (ref 0–0.2)
BASOPHILS NFR BLD AUTO: 0.2 %
BILIRUB SERPL-MCNC: 0.3 MG/DL (ref 0.2–1.1)
BUN BLD-MCNC: 20 MG/DL (ref 9–23)
BUN BLD-MCNC: 24 MG/DL (ref 9–23)
CALCIUM BLD-MCNC: 9 MG/DL (ref 8.7–10.6)
CALCIUM BLD-MCNC: 9.5 MG/DL (ref 8.7–10.6)
CHLORIDE SERPL-SCNC: 101 MMOL/L (ref 98–112)
CHLORIDE SERPL-SCNC: 102 MMOL/L (ref 98–112)
CO2 SERPL-SCNC: 28 MMOL/L (ref 21–32)
CO2 SERPL-SCNC: 29 MMOL/L (ref 21–32)
CREAT BLD-MCNC: 0.82 MG/DL (ref 0.55–1.02)
CREAT BLD-MCNC: 0.87 MG/DL (ref 0.55–1.02)
EGFRCR SERPLBLD CKD-EPI 2021: 66 ML/MIN/1.73M2 (ref 60–?)
EGFRCR SERPLBLD CKD-EPI 2021: 71 ML/MIN/1.73M2 (ref 60–?)
EOSINOPHIL # BLD AUTO: 0.04 X10(3) UL (ref 0–0.7)
EOSINOPHIL NFR BLD AUTO: 0.4 %
ERYTHROCYTE [DISTWIDTH] IN BLOOD BY AUTOMATED COUNT: 13.4 %
ERYTHROCYTE [DISTWIDTH] IN BLOOD BY AUTOMATED COUNT: 13.5 %
GLOBULIN PLAS-MCNC: 2.7 G/DL (ref 2–3.5)
GLUCOSE BLD-MCNC: 106 MG/DL (ref 70–99)
GLUCOSE BLD-MCNC: 93 MG/DL (ref 70–99)
HCT VFR BLD AUTO: 35 % (ref 35–48)
HCT VFR BLD AUTO: 35.1 % (ref 35–48)
HGB BLD-MCNC: 11.9 G/DL (ref 12–16)
HGB BLD-MCNC: 12 G/DL (ref 12–16)
IMM GRANULOCYTES # BLD AUTO: 0.1 X10(3) UL (ref 0–1)
IMM GRANULOCYTES NFR BLD: 1.1 %
LYMPHOCYTES # BLD AUTO: 3.12 X10(3) UL (ref 1–4)
LYMPHOCYTES NFR BLD AUTO: 34.3 %
MCH RBC QN AUTO: 30.8 PG (ref 26–34)
MCH RBC QN AUTO: 30.9 PG (ref 26–34)
MCHC RBC AUTO-ENTMCNC: 34 G/DL (ref 31–37)
MCHC RBC AUTO-ENTMCNC: 34.2 G/DL (ref 31–37)
MCV RBC AUTO: 90.2 FL (ref 80–100)
MCV RBC AUTO: 90.9 FL (ref 80–100)
MONOCYTES # BLD AUTO: 0.95 X10(3) UL (ref 0.1–1)
MONOCYTES NFR BLD AUTO: 10.4 %
NEUTROPHILS # BLD AUTO: 4.87 X10 (3) UL (ref 1.5–7.7)
NEUTROPHILS # BLD AUTO: 4.87 X10(3) UL (ref 1.5–7.7)
NEUTROPHILS NFR BLD AUTO: 53.6 %
OSMOLALITY SERPL CALC.SUM OF ELEC: 288 MOSM/KG (ref 275–295)
OSMOLALITY SERPL CALC.SUM OF ELEC: 290 MOSM/KG (ref 275–295)
PLATELET # BLD AUTO: 314 10(3)UL (ref 150–450)
PLATELET # BLD AUTO: 322 10(3)UL (ref 150–450)
POTASSIUM SERPL-SCNC: 3.4 MMOL/L (ref 3.5–5.1)
POTASSIUM SERPL-SCNC: 3.8 MMOL/L (ref 3.5–5.1)
PROT SERPL-MCNC: 6.9 G/DL (ref 5.7–8.2)
RBC # BLD AUTO: 3.85 X10(6)UL (ref 3.8–5.3)
RBC # BLD AUTO: 3.89 X10(6)UL (ref 3.8–5.3)
SODIUM SERPL-SCNC: 137 MMOL/L (ref 136–145)
SODIUM SERPL-SCNC: 139 MMOL/L (ref 136–145)
WBC # BLD AUTO: 8.1 X10(3) UL (ref 4–11)
WBC # BLD AUTO: 9.1 X10(3) UL (ref 4–11)

## 2025-07-02 PROCEDURE — 97165 OT EVAL LOW COMPLEX 30 MIN: CPT

## 2025-07-02 PROCEDURE — 85027 COMPLETE CBC AUTOMATED: CPT | Performed by: INTERNAL MEDICINE

## 2025-07-02 PROCEDURE — 80048 BASIC METABOLIC PNL TOTAL CA: CPT | Performed by: INTERNAL MEDICINE

## 2025-07-02 PROCEDURE — 97161 PT EVAL LOW COMPLEX 20 MIN: CPT

## 2025-07-02 PROCEDURE — 97530 THERAPEUTIC ACTIVITIES: CPT

## 2025-07-02 PROCEDURE — 97535 SELF CARE MNGMENT TRAINING: CPT

## 2025-07-02 RX ORDER — ATORVASTATIN CALCIUM 20 MG/1
20 TABLET, FILM COATED ORAL DAILY
COMMUNITY
Start: 2025-04-21

## 2025-07-02 RX ORDER — HYDROCODONE BITARTRATE AND ACETAMINOPHEN 5; 325 MG/1; MG/1
1 TABLET ORAL EVERY 6 HOURS PRN
Status: DISCONTINUED | OUTPATIENT
Start: 2025-07-02 | End: 2025-07-04

## 2025-07-02 RX ORDER — SENNOSIDES 8.6 MG
17.2 TABLET ORAL NIGHTLY PRN
Status: DISCONTINUED | OUTPATIENT
Start: 2025-07-02 | End: 2025-07-04

## 2025-07-02 RX ORDER — ACETAMINOPHEN 500 MG
500 TABLET ORAL EVERY 4 HOURS PRN
Status: DISCONTINUED | OUTPATIENT
Start: 2025-07-02 | End: 2025-07-04

## 2025-07-02 RX ORDER — HYDROCHLOROTHIAZIDE 25 MG/1
25 TABLET ORAL DAILY
Status: DISCONTINUED | OUTPATIENT
Start: 2025-07-02 | End: 2025-07-04

## 2025-07-02 RX ORDER — METHYLPREDNISOLONE 4 MG/1
4 TABLET ORAL
Status: COMPLETED | OUTPATIENT
Start: 2025-07-03 | End: 2025-07-03

## 2025-07-02 RX ORDER — HYDRALAZINE HYDROCHLORIDE 20 MG/ML
10 INJECTION INTRAMUSCULAR; INTRAVENOUS EVERY 6 HOURS PRN
Status: DISCONTINUED | OUTPATIENT
Start: 2025-07-02 | End: 2025-07-04

## 2025-07-02 RX ORDER — POTASSIUM CHLORIDE 1500 MG/1
40 TABLET, EXTENDED RELEASE ORAL ONCE
Status: COMPLETED | OUTPATIENT
Start: 2025-07-02 | End: 2025-07-02

## 2025-07-02 RX ORDER — LOSARTAN POTASSIUM 25 MG/1
25 TABLET ORAL 2 TIMES DAILY
Status: DISCONTINUED | OUTPATIENT
Start: 2025-07-02 | End: 2025-07-04

## 2025-07-02 RX ORDER — ATORVASTATIN CALCIUM 20 MG/1
20 TABLET, FILM COATED ORAL DAILY
Status: DISCONTINUED | OUTPATIENT
Start: 2025-07-02 | End: 2025-07-04

## 2025-07-02 RX ORDER — ALPRAZOLAM 0.25 MG
0.25 TABLET ORAL NIGHTLY PRN
Status: DISCONTINUED | OUTPATIENT
Start: 2025-07-02 | End: 2025-07-04

## 2025-07-02 RX ORDER — LOSARTAN POTASSIUM 25 MG/1
25 TABLET ORAL 2 TIMES DAILY
COMMUNITY

## 2025-07-02 RX ORDER — ONDANSETRON 2 MG/ML
4 INJECTION INTRAMUSCULAR; INTRAVENOUS EVERY 6 HOURS PRN
Status: DISCONTINUED | OUTPATIENT
Start: 2025-07-02 | End: 2025-07-04

## 2025-07-02 RX ORDER — METHYLPREDNISOLONE 4 MG/1
4 TABLET ORAL
Status: COMPLETED | OUTPATIENT
Start: 2025-07-02 | End: 2025-07-02

## 2025-07-02 RX ORDER — METHYLPREDNISOLONE 4 MG/1
8 TABLET ORAL
Status: COMPLETED | OUTPATIENT
Start: 2025-07-03 | End: 2025-07-03

## 2025-07-02 RX ORDER — CYCLOBENZAPRINE HCL 10 MG
10 TABLET ORAL 3 TIMES DAILY PRN
Status: DISCONTINUED | OUTPATIENT
Start: 2025-07-02 | End: 2025-07-04

## 2025-07-02 RX ORDER — GABAPENTIN 100 MG/1
100 CAPSULE ORAL NIGHTLY
COMMUNITY
Start: 2023-01-12 | End: 2025-07-04

## 2025-07-02 RX ORDER — GABAPENTIN 100 MG/1
100 CAPSULE ORAL 3 TIMES DAILY
Status: DISCONTINUED | OUTPATIENT
Start: 2025-07-02 | End: 2025-07-04

## 2025-07-02 RX ORDER — METHYLPREDNISOLONE 4 MG/1
4 TABLET ORAL
Status: DISCONTINUED | OUTPATIENT
Start: 2025-07-06 | End: 2025-07-04

## 2025-07-02 RX ORDER — METHYLPREDNISOLONE 4 MG/1
4 TABLET ORAL
Status: DISCONTINUED | OUTPATIENT
Start: 2025-07-04 | End: 2025-07-04

## 2025-07-02 RX ORDER — METHYLPREDNISOLONE 4 MG/1
4 TABLET ORAL
Status: DISCONTINUED | OUTPATIENT
Start: 2025-07-07 | End: 2025-07-04

## 2025-07-02 RX ORDER — MORPHINE SULFATE 2 MG/ML
2 INJECTION, SOLUTION INTRAMUSCULAR; INTRAVENOUS EVERY 4 HOURS PRN
Status: DISCONTINUED | OUTPATIENT
Start: 2025-07-02 | End: 2025-07-04

## 2025-07-02 RX ORDER — METHYLPREDNISOLONE 4 MG/1
4 TABLET ORAL NIGHTLY
Status: DISCONTINUED | OUTPATIENT
Start: 2025-07-06 | End: 2025-07-04

## 2025-07-02 RX ORDER — SODIUM PHOSPHATE, DIBASIC AND SODIUM PHOSPHATE, MONOBASIC 7; 19 G/230ML; G/230ML
1 ENEMA RECTAL ONCE AS NEEDED
Status: DISCONTINUED | OUTPATIENT
Start: 2025-07-02 | End: 2025-07-04

## 2025-07-02 RX ORDER — METHYLPREDNISOLONE 4 MG/1
8 TABLET ORAL
Status: COMPLETED | OUTPATIENT
Start: 2025-07-02 | End: 2025-07-02

## 2025-07-02 RX ORDER — ENOXAPARIN SODIUM 100 MG/ML
40 INJECTION SUBCUTANEOUS DAILY
Status: DISCONTINUED | OUTPATIENT
Start: 2025-07-02 | End: 2025-07-02

## 2025-07-02 RX ORDER — ONDANSETRON 2 MG/ML
4 INJECTION INTRAMUSCULAR; INTRAVENOUS EVERY 4 HOURS PRN
Status: ACTIVE | OUTPATIENT
Start: 2025-07-02 | End: 2025-07-02

## 2025-07-02 RX ORDER — POLYETHYLENE GLYCOL 3350 17 G/17G
17 POWDER, FOR SOLUTION ORAL DAILY PRN
Status: DISCONTINUED | OUTPATIENT
Start: 2025-07-02 | End: 2025-07-04

## 2025-07-02 RX ORDER — ENOXAPARIN SODIUM 100 MG/ML
40 INJECTION SUBCUTANEOUS DAILY
Status: DISCONTINUED | OUTPATIENT
Start: 2025-07-02 | End: 2025-07-04

## 2025-07-02 RX ORDER — BISACODYL 10 MG
10 SUPPOSITORY, RECTAL RECTAL
Status: DISCONTINUED | OUTPATIENT
Start: 2025-07-02 | End: 2025-07-04

## 2025-07-02 RX ORDER — ASPIRIN 81 MG/1
81 TABLET ORAL DAILY
Status: DISCONTINUED | OUTPATIENT
Start: 2025-07-02 | End: 2025-07-04

## 2025-07-02 RX ORDER — DIAZEPAM 10 MG/2ML
2.5 INJECTION, SOLUTION INTRAMUSCULAR; INTRAVENOUS ONCE
Status: COMPLETED | OUTPATIENT
Start: 2025-07-02 | End: 2025-07-02

## 2025-07-02 RX ORDER — METHYLPREDNISOLONE 4 MG/1
4 TABLET ORAL
Status: DISCONTINUED | OUTPATIENT
Start: 2025-07-05 | End: 2025-07-04

## 2025-07-02 NOTE — ED INITIAL ASSESSMENT (HPI)
Pt has h/o back issues. Tonight she rolled over in bed and felt a sharp pain in her low back. Pt was unable to move do to the pain so DIL called EMS.

## 2025-07-02 NOTE — PROGRESS NOTES
NURSING ADMISSION NOTE      Patient admitted via Cart  Oriented to room 532.  Safety precautions initiated.  Bed in low position.  Call light in reach.    Pt aox4, Room air, no tele, admission navigator completed with pt. PT hypertensive upon admission. MD notifited

## 2025-07-02 NOTE — ED QUICK NOTES
Pt states pain meds decreased intensity of pain while they were moving her in xray. Pt has no pain when lying still however is afraid to move due to sharp spasms. Pt is unsure if she \"needs\" any additional pain meds at this time.

## 2025-07-02 NOTE — H&P
[facility] History and Physical      Chief Complaint   Patient presents with    Back Pain        PCP: Ira Fowler MD      History of Present Illness: Patient is a 84 year old female with PMH sig for HTN, HL, carotid disease, depression/anxiety and hx of back pain p/w acute back pain. She rolled over in bed late last night and felt a sharp pain in her lower back. She could barely move due to pain so her family called EMS. She's been having worsening issues for the last 10 days, her PCP prescirbed her steroid therapy which did not improve her symptoms. OTC meds also were not helping. Given the worsening pain she came to the ED.  In the ED she was hypertensive. Labs unremarkable. XR with chronic arthritic changes. She couldn't walk in the ER so she was admitted for further evaluation and treatment.     Past Medical History:    Anxiety state    Cancer (HCC)    SKIN CA    Carotid artery disease    <50%    Cervical osteoarthritis    Closed displaced fracture of left patella  Global  12/29/2019    Diverticulosis    History of squamous cell carcinoma    upper lip - Dr. Mckeon    HTN (hypertension)    Hypercholesterolemia    IBS (irritable bowel syndrome)    Lumbar disc disease    Migraines    Mild aortic regurgitation    ECHO at [facility]    Mild mitral regurgitation    ECHO at [facility]    Multiple thyroid nodules    last US 2018   no fu needed    Osteoporosis    PONV (postoperative nausea and vomiting)    Removal of hardward from left knee  Global 09/17/2020    SCC (squamous cell carcinoma)    L neck per Dr. Mckeon    Tubular adenoma    Dr. Ramos    Vaginal burning    started on metrondiazole    Vitamin D deficiency      Past Surgical History:   Procedure Laterality Date    Colonoscopy  2010    Dr. Ramos    Colonoscopy  11/8/2013    Procedure: COLONOSCOPY;  Surgeon: Pritesh Ramos MD;  Location:  ENDOSCOPY    Colonoscopy N/A 10/8/2021    Procedure: COLONOSCOPY;  Surgeon: Pritesh Ramos MD;  Location:  ENDOSCOPY     Hysterectomy  2000    Other      RHINOPLASTY    Other  06/2020    removal of hardware L knee   Dr. Platt    Other      fracture patella- left knee    Skin surgery  12/16/2021    SCCIS to Left Frontal Scalp, Mohs, Dr. Sánchez        ALL:  Allergies   Allergen Reactions    Latex HIVES     Patient states from bandaids with latex     Macrobid [Nitrofurantoin Monohydrate Macrocrystals] HIVES    Paxil [Paroxetine] NAUSEA AND VOMITING    Adhesive Tape HIVES    Ciprofloxacin HIVES    Prozac [Fluoxetine] UNKNOWN    Shrimp HIVES    Tramadol NAUSEA ONLY        No current outpatient medications on file.       Social History     Tobacco Use    Smoking status: Never    Smokeless tobacco: Never   Substance Use Topics    Alcohol use: Yes     Alcohol/week: 0.0 standard drinks of alcohol     Comment: 4 DRINKS MONTH        Fam Hx  Family History   Problem Relation Age of Onset    Heart Disorder Father     Cancer Mother         panc    Cancer Brother         esophagus       Review of Systems  Comprehensive ROS reviewed and negative except for what is stated in HPI.      OBJECTIVE:  /30   Pulse 63   Temp 98.1 °F (36.7 °C) (Oral)   Resp 18   Wt 111 lb 6.4 oz (50.5 kg)   SpO2 94%   BMI 21.76 kg/m²   General:  Alert, no distress, appears stated age.    Head:  Normocephalic, without obvious abnormality, atraumatic.   Eyes:  Sclera anicteric, No conjunctival pallor, EOMs intact.    Nose: Nares normal. Septum midline. Mucosa normal. No drainage.   Throat: Lips, mucosa, and tongue normal. Teeth and gums normal.   Neck: Supple, symmetrical, trachea midline, no cervical or supraclavicular lymph adenopathy, thyroid: no enlargment/tenderness/nodules appreciated   Lungs:   Clear to auscultation bilaterally. Normal effort   Chest wall:  No tenderness or deformity.   Heart:  Regular rate and rhythm, S1, S2 normal, no murmur, rub or gallop appreciated   Abdomen:   Soft, non-tender. Bowel sounds normal. No masses,  No organomegaly. Non  distended   Extremities: Extremities normal, atraumatic, no cyanosis or edema.   Skin: Skin color, texture, turgor normal. No rashes or lesions.    Neurologic: Normal strength, no focal deficit appreciated     Data Review:    LABS:   Lab Results   Component Value Date    WBC 8.1 07/02/2025    HGB 12.0 07/02/2025    HCT 35.1 07/02/2025    .0 07/02/2025    CREATSERUM 0.82 07/02/2025    BUN 20 07/02/2025     07/02/2025    K 3.4 07/02/2025     07/02/2025    CO2 29.0 07/02/2025    GLU 93 07/02/2025    CA 9.0 07/02/2025    ALB 4.2 07/01/2025    ALKPHO 54 07/01/2025    BILT 0.3 07/01/2025    TP 6.9 07/01/2025    AST 28 07/01/2025    ALT 29 07/01/2025       CXR: All imaging personally reviewed.      Radiology: XR LUMBAR SPINE (MIN 4 VIEWS) (CPT=72110)  Result Date: 7/2/2025  PROCEDURE: XR LUMBAR SPINE (MIN 4 VIEWS) (CPT=72110) INDICATIONS: Back Pain PATIENT STATED HISTORY: Patient fell out of bed. Pain to entire lumbar. unable to stand. COMPARISON: 06/09/2021 CT ABDOMEN+PELVIS(CONTRAST ONLY)(CPT=74177) FINDINGS: BONES: Dextroscoliosis lumbar spine. Vertebral body heights are overall maintained. DISC SPACES: Marked loss of L3-L4 disc base. Endplate osteoarthritic changes. PARASPINOUS: Negative.  No paraspinous abnormality is seen. OTHER:Negative.     CONCLUSION: Moderate to marked osteoarthritic changes are noted. Dextroscoliosis of the lumbar spine. Electronically Verified and Signed by Attending Radiologist: Walter Cadet MD 7/2/2025 12:00 AM Workstation: [id number]       Assessment/Plan:     84 year old female with PMH sig for HTN, HL, carotid disease, depression/anxiety and hx of back pain p/w acute back pain.     Acute on chronic back pain  - medrol dose pradeep  - prn analgesics  - PT/OT  - will get lumbar MRI to give consideration for an PABLO  - start gabapentin TID 100mg    Carotid disease - asa, statin  Essential HTN - losartan, thiazide  Depression/anxiety - sertraline    FEN: regular diet,  PT/OT  Proph: SCDs, lovenox      Outpatient records or previous hospital records reviewed.   [facility] hospitalist to continue to follow patient while in house      Indiana Bonilla MD  [facility]  Hospitalist  Message over [employer]  Pager: [phone]

## 2025-07-02 NOTE — PROGRESS NOTES
Assumed care of pt around 0730. Pt AO x4. VSS on RA. No tele orders. Prn meds for pain. Pt up sba, voiding per bsc and bathroom. Pt tolerating regular diet. MRI screening form done. Pt and pt family updated on poc

## 2025-07-02 NOTE — OCCUPATIONAL THERAPY NOTE
OCCUPATIONAL THERAPY EVALUATION - INPATIENT     Room Number: 532/532-A  Evaluation Date: 7/2/2025  Type of Evaluation: Initial  Presenting Problem: back pain, lumbosacral    Physician Order: IP Consult to Occupational Therapy  Reason for Therapy: ADL/IADL Dysfunction and Discharge Planning    OCCUPATIONAL THERAPY ASSESSMENT   Patient is currently functioning below baseline with toileting, upper body dressing, lower body dressing, bed mobility, transfers, static sitting balance, dynamic sitting balance, static standing balance, dynamic standing balance, maintaining seated position, functional standing tolerance, energy conservation strategies, and aerobic capacity. Prior to admission, patient's baseline is IND with ADLs/IADLs.  Patient is requiring maximum assistance as a result of the following impairments: decreased functional strength, decreased functional reach, decreased endurance, pain, and impaired sitting and standing balance. Occupational Therapy will continue to follow for duration of hospitalization.    Patient will benefit from continued skilled OT Services for duration of hospitalization, however, given the patient is functioning near baseline level do not anticipate skilled therapy needs at discharge     History Related to Current Admission: Patient is a 84 year old female admitted on 7/1/2025 with Presenting Problem: back pain, lumbosacral. Co-Morbidities : anxiety, migraines, HTN, skin cancer, L patella fracture, chronic back pain    Imaging:  XR LUMBAR SPINE (MIN 4 VIEWS) (CPT=72110)  Result Date: 7/2/2025  CONCLUSION: Moderate to marked osteoarthritic changes are noted. Dextroscoliosis of the lumbar spine.     WEIGHT BEARING RESTRICTION       Recommendations for nursing staff:   Transfers: up x 1 assist, CGA, RW  Toileting location: bedside commode    EVALUATION SESSION:  Patient Start of Session:semi supine in bed    FUNCTIONAL TRANSFER ASSESSMENT  Sit to Stand: Edge of Bed  Edge of Bed: Contact  Guard Assist  Commode Transfer: Contact Guard Assist    BED MOBILITY  Supine to Sit : Contact Guard Assist  Sit to Supine (OT): Not Tested    BALANCE ASSESSMENT  Static Sitting: Stand-by Assist  Static Standing: Contact Guard Assist    FUNCTIONAL ADL ASSESSMENT  LB Dressing Seated: Maximum Assist (socks, maxA to don brief while seated on commode)  LB Dressing Standing: Moderate Assist (to pull up brief while standing at commode)  Toileting Seated: Stand-by Assist    ACTIVITY TOLERANCE: Pt tolerated bed mobility to sit EOB with CGA in prep for seated ADL task, observed to don/doff socks using compensatory technique with increased time. Pt tolerated sit <> stand from bed with CGA/handheld assist to transfer to commode; pt safely seated on commode for toileting task, however after toileting, pt became very nauseous and fearful; cool wash cloth provided and gentle reassurance provided to pt. Pt tolerated transfer from commode > chair with CGA, safely seated in chair at end of session with legs reclined.                          O2 SATURATIONS       COGNITION  Overall Cognitive Status:  WFL - within functional limits    Upper Extremity   ROM: within functional limits  Strength: within functional limits    EDUCATION PROVIDED  Patient Education : Role of Occupational Therapy; Plan of Care; Functional Transfer Techniques; Fall Prevention; Posture/Positioning; Energy Conservation; Proper Body Mechanics  Patient's Response to Education: Verbalized Understanding; Requires Further Education    Equipment used: bedside commode     Therapist comments: RN cleared pt for session, pt agreeable to therapy this date. See activity tolerance section for details on standing activity this date. Pt educated on spinal precautions for comfort to follow during recovery, pt verbalized understanding, will continue to educate in next sessions. Pt became very anxious/tearful when experiencing nausea, pt provided cool wash cloth and gentle  reassurance for comfort. Pt safely seated in chair at end of session with legs reclined.     Patient End of Session: Up in chair, Needs met, Call light within reach, RN aware of session/findings, All patient questions and concerns addressed, Hospital anti-slip socks, Alarm set    OCCUPATIONAL PROFILE    HOME SITUATION  Type of Home: House  Home Layout: Two level, Able to live on main level  Lives With: Daughter                     Drives: Yes  Patient Regularly Uses: None    Prior Level of Function: IND with ADLs/IADLs, pt states she enjoys staying active doing garden work.    SUBJECTIVE   \"Are you coming back?\"    PAIN ASSESSMENT  Rating: Unable to rate  Location: back pain + nausea  Management Techniques: Repositioning, Relaxation, Breathing techniques    OBJECTIVE  Precautions: Bed/chair alarm (spine precautions for comfort)  Fall Risk: High fall risk    ASSESSMENTS    AM-PAC ‘6-Clicks’ Inpatient Daily Activity Short Form  -   Putting on and taking off regular lower body clothing?: A Little  -   Bathing (including washing, rinsing, drying)?: A Little  -   Toileting, which includes using toilet, bedpan or urinal? : A Little  -   Putting on and taking off regular upper body clothing?: A Little  -   Taking care of personal grooming such as brushing teeth?: A Little  -   Eating meals?: A Little    AM-PAC Score:  Score: 18  Approx Degree of Impairment: 46.65%  Standardized Score (AM-PAC Scale): 38.66    ADDITIONAL TESTS     NEUROLOGICAL FINDINGS      COGNITION ASSESSMENTS     PLAN  OT Device Recommendations: 3-in-1 commode  OT Treatment Plan: Balance activities, Energy conservation/work simplification techniques, ADL training, IADL training, Functional transfer training, UE strengthening/ROM, Patient/Family education, Endurance training, Patient/Family training, Equipment eval/education, Compensatory technique education, Continued evaluation  Rehab Potential : Good  Frequency: 3-5x/week       ADL Goals   Patient will  perform lower body dressing:  with supervision and with adaptive equipment PRN  Patient will perform toileting: with supervision    Functional Transfer Goals  Patient will transfer from supine to sit:  with supervision  Patient will transfer from sit to stand:  with supervision  Patient will transfer to toilet:  with supervision    Patient Evaluation Complexity Level:   Occupational Profile/Medical History LOW - Brief history including review of medical or therapy records    Specific performance deficits impacting engagement in ADL/IADL LOW  1 - 3 performance deficits    Client Assessment/Performance Deficits MODERATE - Comorbidities and min to mod modifications of tasks    Clinical Decision Making LOW - Analysis of occupational profile, problem-focused assessments, limited treatment options    Overall Complexity LOW     OT Session Time: 25 minutes  Self-Care Home Management: 15 minutes

## 2025-07-02 NOTE — ED QUICK NOTES
Orders for admission, patient is aware of plan and ready to go upstairs. Any questions, please call ED RN RAND at extension [phone].     Patient Covid vaccination status: Fully vaccinated     COVID Test Ordered in ED: None    COVID Suspicion at Admission: N/A    Running Infusions: Medication Infusions[1] None    Mental Status/LOC at time of transport: ALERT X 3    Other pertinent information:  FROM HOME, PAIN WITH MOVEMENT. UNABLE TO AMBULATE DUE TO PAIN  CIWA score: N/A   NIH score:  N/A             [1]

## 2025-07-02 NOTE — PHYSICAL THERAPY NOTE
PHYSICAL THERAPY EVALUATION - INPATIENT     Room Number: 532/532-A  Evaluation Date: 7/2/2025  Type of Evaluation: Initial  Physician Order: PT Eval and Treat    Presenting Problem: back pain  Co-Morbidities : anxiety, migraines, HTN, skin cancer, L patella fracture, chronic back pain  Reason for Therapy: Mobility Dysfunction and Discharge Planning    PHYSICAL THERAPY ASSESSMENT   Patient is a 84 year old female admitted 7/1/2025 for back pain.  Prior to admission, patient's baseline is independent with no AD.  Patient is currently functioning below baseline with bed mobility, transfers, gait, and stair negotiation.  Patient is requiring contact guard assist and minimal assist as a result of the following impairments: pain.  Physical Therapy will continue to follow for duration of hospitalization.    Patient will benefit from continued skilled PT Services at discharge to promote prior level of function and safety with additional support and return home with OP PT.    PLAN DURING HOSPITALIZATION  Nursing Mobility Recommendation : 1 Assist     PT Treatment Plan: Bed mobility, Body mechanics, Endurance, Energy conservation, Patient education, Family education, Gait training, Strengthening, Stair training, Transfer training, Balance training  Rehab Potential : Good  Frequency (Obs): 3-5x/week     CURRENT GOALS    Goal #1 Patient is able to demonstrate supine - sit EOB @ level: supervision     Goal #2 Patient is able to demonstrate transfers Sit to/from Stand at assistance level: supervision     Goal #3 Patient is able to ambulate 150 feet with assist device: LRAD at assistance level: supervision     Goal #4    Goal #5    Goal #6    Goal Comments: Goals established on 7/2/2025      PHYSICAL THERAPY MEDICAL/SOCIAL HISTORY  History related to current admission: Patient is a 84 year old female admitted on 7/1/2025 from home for back pain.    HOME SITUATION  Type of Home: House  Home Layout: Two level, Able to live on main  level                     Lives With: Daughter        Patient Regularly Uses: None      Prior Level of [place]: Pt is typically independent with ADLs and ambulates with no AD. Pt is active.     SUBJECTIVE  \"I was doing all of this before\"     OBJECTIVE  Precautions: Bed/chair alarm (spine precautions for comfort)  Fall Risk: High fall risk    WEIGHT BEARING RESTRICTION     PAIN ASSESSMENT  Rating: Unable to rate  Location: back  Management Techniques: Activity promotion, Body mechanics, Relaxation, Repositioning    COGNITION  Overall Cognitive Status:  WFL - within functional limits    RANGE OF MOTION AND STRENGTH ASSESSMENT  Upper extremity ROM and strength are within functional limits     Lower extremity ROM is within functional limits     Lower extremity strength is within functional limits     BALANCE  Static Sitting: Good  Dynamic Sitting: Good  Static Standing: Poor +  Dynamic Standing: Poor +    ADDITIONAL TESTS                                    ACTIVITY TOLERANCE   Pt nauseous and c/o HA after transferring to commode. RN notified                      O2 WALK       NEUROLOGICAL FINDINGS                        AM-PAC '6-Clicks' INPATIENT SHORT FORM - BASIC MOBILITY  How much difficulty does the patient currently have...  Patient Difficulty: Turning over in bed (including adjusting bedclothes, sheets and blankets)?: None   Patient Difficulty: Sitting down on and standing up from a chair with arms (e.g., wheelchair, bedside commode, etc.): A Little   Patient Difficulty: Moving from lying on back to sitting on the side of the bed?: A Little   How much help from another person does the patient currently need...   Help from Another: Moving to and from a bed to a chair (including a wheelchair)?: A Little   Help from Another: Need to walk in hospital room?: A Little   Help from Another: Climbing 3-5 steps with a railing?: A Little     AM-PAC Score:  Raw Score: 19   Approx Degree of Impairment: 41.77%    Standardized Score (AM-PAC Scale): 45.44   CMS Modifier (G-Code): CK    FUNCTIONAL ABILITY STATUS  Gait Assessment   Functional Mobility/Gait Assessment  Gait Assistance: Contact guard assist  Distance (ft): 5,2  Assistive Device: None  Pattern: Shuffle    Skilled Therapy Provided: Per RN okay to work with pt. Pt received in supine and was agreeable to PT session.     Bed Mobility:  Rolling: mod ind via log roll   Supine to sit: min A via log roll    Sit to supine: NT     Transfer Mobility:  Sit to stand: CGA via HHA    Stand to sit: CGA via HHA  Gait = pt ambulated from bed to commode and commode to chair with no AD but with HHA and CGA    Therapist's Comments: Pt educated on role of therapy, goals for session, safety, fall prevention, spine precautions for comfort, log roll, body mechanics, and activity recommendations.     Exercise/Education Provided:  Bed mobility  Body mechanics  Energy conservation  Functional activity tolerated  Gait training  Posture  Strengthening  Transfer training    Patient End of Session: Up in chair, Needs met, Call light within reach, RN aware of session/findings, All patient questions and concerns addressed, Hospital anti-slip socks, Alarm set      Patient Evaluation Complexity Level:  History Moderate - 1 or 2 personal factors and/or co-morbidities   Examination of body systems Low -  addressing 1-2 elements   Clinical Presentation Low- Stable   Clinical Decision Making Low Complexity       PT Session Time: 30 minutes  Therapeutic Activity: 10 minutes

## 2025-07-02 NOTE — ED PROVIDER NOTES
Patient Seen in: [facility]        History  Chief Complaint   Patient presents with    Back Pain     Stated Complaint: Back Pain    Subjective:   HPI            84-year-old female presents today for evaluation of back pain.  Patient has had issues with chronic back pain in the past.  For the last 10 days, patient has been having a pain in her bilateral lower back that is worse with movement.  She saw her primary care doctor and was prescribed prednisone and therapy was recommended.  She had no improvement with over-the-counter pain medicine.  This evening, patient noted more intense pain while laying in bed.  Patient has had no lower extremity weakness, sensory changes or loss of bowel/bladder function.  She denies any trauma.      Objective:     Past Medical History:    Anxiety state    Cancer (HCC)    SKIN CA    Carotid artery disease    <50%    Cervical osteoarthritis    Closed displaced fracture of left patella  Global  12/29/2019    Diverticulosis    History of squamous cell carcinoma    upper lip - Dr. Mckeon    HTN (hypertension)    Hypercholesterolemia    IBS (irritable bowel syndrome)    Lumbar disc disease    Migraines    Mild aortic regurgitation    ECHO at [facility]    Mild mitral regurgitation    ECHO at [facility]    Multiple thyroid nodules    last US 2018   no fu needed    Osteoporosis    PONV (postoperative nausea and vomiting)    Removal of hardward from left knee  Global 09/17/2020    SCC (squamous cell carcinoma)    L neck per Dr. Mckeon    Tubular adenoma    Dr. Ramos    Vaginal burning    started on metrondiazole    Vitamin D deficiency              Past Surgical History:   Procedure Laterality Date    Colonoscopy  2010    Dr. Ramos    Colonoscopy  11/8/2013    Procedure: COLONOSCOPY;  Surgeon: Pritesh Ramos MD;  Location:  ENDOSCOPY    Colonoscopy N/A 10/8/2021    Procedure: COLONOSCOPY;  Surgeon: Pritesh Rmaos MD;  Location:  ENDOSCOPY    Hysterectomy  2000    Other  Health Maintenance Due   Topic Date Due   • Shingles Vaccine (1 of 2) 08/08/2019   • Meningococcal Serogroup B Vaccine (2 of 2 - Risk Bexsero 2-dose series) 01/06/2020   • Meningococcal Vaccine (2 - Risk 2-dose series) 02/03/2020   • Cervical Cancer Screening HPV CO-Testing  04/07/2020   • Depression Screening  08/22/2020     Patient is due for topics as listed above but is not proceeding with Immunization(s) Meningococcal and Shingles and Cervical cancer screening at this time. Patient states she is not feeling well. She will wait until her appt with Meri Dillard.              RHINOPLASTY    Other  06/2020    removal of hardware L knee   Dr. Platt    Other      fracture patella- left knee    Skin surgery  12/16/2021    SCCIS to Left Frontal Scalp, Mohs, Dr. Sánchez                Social History     Socioeconomic History    Marital status:    Tobacco Use    Smoking status: Never    Smokeless tobacco: Never   Vaping Use    Vaping status: Never Used   Substance and Sexual Activity    Alcohol use: Yes     Alcohol/week: 0.0 standard drinks of alcohol     Comment: 4 DRINKS MONTH    Drug use: Never                                Physical Exam    ED Triage Vitals   BP 07/01/25 2248 (!) 176/95   Pulse 07/01/25 2248 73   Resp 07/01/25 2248 14   Temp 07/01/25 2248 97.9 °F (36.6 °C)   Temp src 07/01/25 2248 Temporal   SpO2 07/01/25 2248 96 %   O2 Device 07/01/25 2330 None (Room air)       Current Vitals:   Vital Signs  BP: (!) 163/66  Pulse: (!) 42  Resp: 14  Temp: 97.9 °F (36.6 °C)  Temp src: Temporal  MAP (mmHg): 94    Oxygen Therapy  SpO2: 97 %  O2 Device: None (Room air)            Physical Exam  Vitals and nursing note reviewed.   Constitutional:       Appearance: Normal appearance.   HENT:      Head: Normocephalic.      Nose: Nose normal.      Mouth/Throat:      Mouth: Mucous membranes are moist.   Eyes:      Extraocular Movements: Extraocular movements intact.   Cardiovascular:      Rate and Rhythm: Normal rate.   Pulmonary:      Effort: Pulmonary effort is normal.   Abdominal:      General: Abdomen is flat.   Musculoskeletal:         General: Normal range of motion.      Comments: No midline thoracolumbar tenderness or step-off   Skin:     General: Skin is warm.   Neurological:      General: No focal deficit present.      Mental Status: She is alert.      Sensory: No sensory deficit.      Motor: No weakness.   Psychiatric:         Mood and Affect: Mood normal.             ED Course  Labs Reviewed   CBC WITH DIFFERENTIAL WITH PLATELET - Abnormal; Notable for the following  components:       Result Value    HGB 11.9 (*)     All other components within normal limits   COMP METABOLIC PANEL (14) - Abnormal; Notable for the following components:    Glucose 106 (*)     BUN 24 (*)     Alkaline Phosphatase 54 (*)     All other components within normal limits   RAINBOW DRAW LAVENDER   RAINBOW DRAW LIGHT GREEN   RAINBOW DRAW BLUE          XR LUMBAR SPINE (MIN 4 VIEWS) (CPT=72110)  Result Date: 7/2/2025  PROCEDURE: XR LUMBAR SPINE (MIN 4 VIEWS) (CPT=72110) INDICATIONS: Back Pain PATIENT STATED HISTORY: Patient fell out of bed. Pain to entire lumbar. unable to stand. COMPARISON: 06/09/2021 CT ABDOMEN+PELVIS(CONTRAST ONLY)(CPT=74177) FINDINGS: BONES: Dextroscoliosis lumbar spine. Vertebral body heights are overall maintained. DISC SPACES: Marked loss of L3-L4 disc base. Endplate osteoarthritic changes. PARASPINOUS: Negative.  No paraspinous abnormality is seen. OTHER:Negative.     CONCLUSION: Moderate to marked osteoarthritic changes are noted. Dextroscoliosis of the lumbar spine. Electronically Verified and Signed by Attending Radiologist: Walter Cadet MD 7/2/2025 12:00 AM Workstation: [id number]                      [facility]     Differential Diagnosis  84-year-old female presents today for evaluation of acute on chronic back pain.  She has normal strength and sensation of the bilateral lower extremities.  She has not any loss of bowel or bladder function.  She does not have any fevers notes overall very well-appearing.  Differential includes compression fracture, spinal stenosis, lymphadenopathy, lumbar muscular spasm.  Plan for pain medicine, will reassess.    1:19 am  X-ray reveals arthritic changes.  On reassessment, patient is comfortable while at rest however complains of bilateral back spasm with any minimal movement.  With her inability to ambulate and discomfort, will admit for continued care.  I spoke with the hospitalist in regards to admission.    External Chart Reviewed  I reviewed  patient's primary care note from June 25    Discussions of Management    I spoke with the hospitalist in regards to admission.    Admission disposition: 7/2/2025  1:20 AM           Medical Decision Making      Disposition and Plan     Clinical Impression:  1. Back pain, lumbosacral         Disposition:  Admit  7/2/2025  1:20 am    Follow-up:  No follow-up provider specified.        Medications Prescribed:  Current Discharge Medication List                Supplementary Documentation:         Hospital Problems       Present on Admission  Date Reviewed: 1/18/2022          ICD-10-CM Noted POA    * (Principal) Back pain, lumbosacral M54.50 7/2/2025 Unknown

## 2025-07-03 LAB — POTASSIUM SERPL-SCNC: 4.3 MMOL/L (ref 3.5–5.1)

## 2025-07-03 PROCEDURE — 97530 THERAPEUTIC ACTIVITIES: CPT

## 2025-07-03 PROCEDURE — 97116 GAIT TRAINING THERAPY: CPT

## 2025-07-03 PROCEDURE — 84132 ASSAY OF SERUM POTASSIUM: CPT | Performed by: HOSPITALIST

## 2025-07-03 NOTE — PROGRESS NOTES
The patient is A/Ox4, On RA, no SOB, Vitals Stable, Afebrile, Pain control with PRN medications per MAR  Voids  Ambulatory  POC: MRI lumbar spine

## 2025-07-03 NOTE — PHYSICAL THERAPY NOTE
PHYSICAL THERAPY TREATMENT NOTE - INPATIENT    Room Number: 532/532-A     Session: 1     Number of Visits to Meet Established Goals: 4    Presenting Problem: back pain  Co-Morbidities : anxiety, migraines, HTN, skin cancer, L patella fracture, chronic back pain    PHYSICAL THERAPY MEDICAL/SOCIAL HISTORY  History related to current admission: Patient is a 84 year old female admitted on 7/1/2025 from home for back pain.     HOME SITUATION  Type of Home: House  Home Layout: Two level, Able to live on main level           Lives With: Daughter        Patient Regularly Uses: None       Prior Level of [place]: Pt is typically independent with ADLs and ambulates with no AD. Pt is active.     PHYSICAL THERAPY ASSESSMENT   Patient demonstrates good  progress this session, goals  remain in progress.      Patient is requiring contact guard assist as a result of the following impairments: pain.     Patient continues to function below baseline with bed mobility, transfers, gait, and stair negotiation.  Next session anticipate patient to progress bed mobility, transfers, gait, and stair negotiation.  Physical Therapy will continue to follow patient for duration of hospitalization.    Patient continues to benefit from continued skilled PT services: at discharge to promote prior level of function.  Anticipate patient will return home with OP PT.    PLAN DURING HOSPITALIZATION  Nursing Mobility Recommendation : 1 Assist     PT Treatment Plan: Bed mobility, Body mechanics, Endurance, Energy conservation, Patient education, Family education, Gait training, Strengthening, Stair training, Transfer training, Balance training  Frequency (Obs): 3-5x/week     CURRENT GOALS     Goal #1 Patient is able to demonstrate supine - sit EOB @ level: supervision      Goal #2 Patient is able to demonstrate transfers Sit to/from Stand at assistance level: supervision      Goal #3 Patient is able to ambulate 150 feet with assist device: LRAD at  assistance level: supervision      Goal #4  New goal 7/3: Patient is able to stair climb a flight of stairs with supervision   Goal #5     Goal #6     Goal Comments: Goals established on 7/2/2025     7/3/2025 all goals ongoing    SUBJECTIVE  \"Oh honey, thank you so much for coming\"    OBJECTIVE  Precautions: Bed/chair alarm (spine precautions for comfort)    WEIGHT BEARING RESTRICTION     PAIN ASSESSMENT   Rating: Unable to rate  Location: back  Management Techniques: Activity promotion, Body mechanics, Relaxation, Repositioning    BALANCE                                                                                                                       Static Sitting: Good  Dynamic Sitting: Good           Static Standing: Fair  Dynamic Standing: Poor +    ACTIVITY TOLERANCE                         O2 WALK       AM-PAC '6-Clicks' INPATIENT SHORT FORM - BASIC MOBILITY  How much difficulty does the patient currently have...  Patient Difficulty: Turning over in bed (including adjusting bedclothes, sheets and blankets)?: None   Patient Difficulty: Sitting down on and standing up from a chair with arms (e.g., wheelchair, bedside commode, etc.): A Little   Patient Difficulty: Moving from lying on back to sitting on the side of the bed?: A Little   How much help from another person does the patient currently need...   Help from Another: Moving to and from a bed to a chair (including a wheelchair)?: A Little   Help from Another: Need to walk in hospital room?: A Little   Help from Another: Climbing 3-5 steps with a railing?: A Little     AM-PAC Score:  Raw Score: 19   Approx Degree of Impairment: 41.77%   Standardized Score (AM-PAC Scale): 45.44   CMS Modifier (G-Code): CK    FUNCTIONAL ABILITY STATUS  Gait Assessment   Functional Mobility/Gait Assessment  Gait Assistance: Contact guard assist  Distance (ft): 100  Assistive Device: None  Pattern: Shuffle  Stairs: Stairs  How Many Stairs: 10  Device: 1 Rail  Assist:  Contact guard assist  Pattern: Ascend, Descend  Ascend: Reciprocal  Descend: Step to    Skilled Therapy Provided: Per RN okay to work with pt. Pt received in supine and was agreeable to PT session.     Bed Mobility:  Rolling: mod ind via log roll    Supine<>Sit: mod ind via log roll. Pt initially did not utilize log roll to get out of bed, but with re-explanation was able to the second time  Sit<>Supine: min A to BLE via log roll on first attempt. CGA to BLE via log roll on second attempt    Transfer Mobility:  Sit<>Stand: CGA   Stand<>Sit: supervision   Gait: pt ambulated with no AD and CGA, pt taking small steps and prefers to keep B hands on lower back while ambulating    Pt stair climbed as above. Further explanation provided to pt that she could utilize step to pattern for ascending the stairs as needed for comfort.     Therapist's Comments: Pt educated on role of therapy, goals for session, safety, fall prevention, spine precautions, log roll, body mechanics, and activity recommendations.     Patient End of Session: In bed, Needs met, Call light within reach, RN aware of session/findings, All patient questions and concerns addressed, Hospital anti-slip socks    PT Session Time: 23 minutes  Gait Training: 15 minutes  Therapeutic Activity: 8 minutes

## 2025-07-03 NOTE — PLAN OF CARE
Pt is A&Ox 4. VSS, afebrile and denies any pain. SPO2 maintained on room air. Continuous pulse ox.  Lovenox.  Reg diet. Denies any n/v/d. Voids. Up with Standby. Pt is updated with plan of care.    Problem: Patient/Family Goals  Goal: Patient/Family Long Term Goal  Description: Patient's Long Term Goal: Discharge    Interventions:  - consults, MRI  - See additional Care Plan goals for specific interventions  Outcome: Progressing  Goal: Patient/Family Short Term Goal  Description: Patient's Short Term Goal: 7/2 noc: rest    Interventions:   - cluster care  - See additional Care Plan goals for specific interventions  Outcome: Progressing

## 2025-07-03 NOTE — PROGRESS NOTES
[facility]  Hospitalist Progress Note                                                                     [facility]   part of [facility]        Isha Martin  8/11/1940    SUBJECTIVE:  Patient seen and examined.  Pain stable.  Worsens on movement.  Denies CP/SOB.  NAD.       OBJECTIVE:  Temp:  [98.2 °F (36.8 °C)-98.7 °F (37.1 °C)] 98.2 °F (36.8 °C)  Pulse:  [47-68] 59  Resp:  [16-18] 16  BP: ()/(39-57) 110/55  SpO2:  [93 %] 93 %  Exam  Gen: No acute distress, alert and oriented x3, no focal neurologic deficits  Pulm: Lungs clear bilaterally, normal respiratory effort  CV: Heart with regular rate and rhythm, no murmur.  Normal PMI.    Abd: Abdomen soft, nontender, nondistended, no organomegaly, bowel sounds present  MSK: Full range of motion in extremities, no clubbing, no cyanosis  Skin: no rashes or lesions    Labs:   Recent Labs   Lab 07/01/25 2251 07/02/25  0638   WBC 9.1 8.1   HGB 11.9* 12.0   MCV 90.9 90.2   .0 314.0       Recent Labs   Lab 07/01/25 2251 07/02/25  0638 07/03/25  0545    139  --    K 3.8 3.4* 4.3    102  --    CO2 28.0 29.0  --    BUN 24* 20  --    CREATSERUM 0.87 0.82  --    CA 9.5 9.0  --    * 93  --        Recent Labs   Lab 07/01/25 2251   ALT 29   AST 28   ALB 4.2       No results for input(s): \"PGLU\" in the last 168 hours.    Meds:   Scheduled:    methylPREDNISolone  4 mg Oral TID CC    methylPREDNISolone  8 mg Oral nightly    [START ON 7/4/2025] methylPREDNISolone  4 mg Oral TID CC and HS    [START ON 7/5/2025] methylPREDNISolone  4 mg Oral Daily with breakfast    [START ON 7/5/2025] methylPREDNISolone  4 mg Oral Daily with lunch    [START ON 7/5/2025] methylPREDNISolone  4 mg Oral nightly    [START ON 7/6/2025] methylPREDNISolone  4 mg Oral Daily with breakfast    [START ON 7/6/2025] methylPREDNISolone  4 mg Oral Nightly    [START ON 7/7/2025] methylPREDNISolone  4 mg Oral Daily  with breakfast    enoxaparin  40 mg Subcutaneous Daily    aspirin  81 mg Oral Daily    atorvastatin  20 mg Oral Daily    hydroCHLOROthiazide  25 mg Oral Daily    losartan  25 mg Oral BID    sertraline  50 mg Oral Daily    gabapentin  100 mg Oral TID     Continuous Infusions:   PRN:   acetaminophen    HYDROcodone-acetaminophen    morphINE    polyethylene glycol (PEG 3350)    sennosides    bisacodyl    fleet enema    cyclobenzaprine    ALPRAZolam    hydrALAzine    ondansetron    melatonin    Assessment/Plan:  Principal Problem:    Back pain, lumbosacral    84 year old female with PMH sig for HTN, HL, carotid disease, depression/anxiety and hx of back pain p/w acute back pain.      Acute on chronic back pain  - likely herniated disc vs. Facet arthropathy  - medrol dose pradeep  - prn analgesics  - PT/OT  - will get lumbar MRI to give consideration for an PABLO - pending  - cont gabapentin TID 100mg     Carotid disease - asa, statin  Essential HTN - losartan, thiazide  Depression/anxiety - sertraline     FEN: regular diet, PT/OT  Proph: SCDs, lovenox      Indiana Bonilla MD  [facility]  Message over [employer]  Pager: [phone]    Supplementary Documentation:   DVT Mechanical Prophylaxis:   SCDs,    DVT Pharmacologic Prophylaxis   Medication    enoxaparin (Lovenox) 40 MG/0.4ML SUBQ injection 40 mg         DVT Pharmacologic prophylaxis: Aspirin 81 mg      Code Status: Prior  Bill: No urinary catheter in place  Bill Duration (in days):   Central line:    BLAINE:

## 2025-07-04 VITALS
HEART RATE: 51 BPM | OXYGEN SATURATION: 100 % | TEMPERATURE: 98 F | SYSTOLIC BLOOD PRESSURE: 167 MMHG | WEIGHT: 111.38 LBS | RESPIRATION RATE: 16 BRPM | DIASTOLIC BLOOD PRESSURE: 62 MMHG | BODY MASS INDEX: 21.87 KG/M2 | HEIGHT: 60 IN

## 2025-07-04 RX ORDER — GABAPENTIN 100 MG/1
100 CAPSULE ORAL 3 TIMES DAILY
Qty: 90 CAPSULE | Refills: 0 | Status: SHIPPED | OUTPATIENT
Start: 2025-07-04

## 2025-07-04 RX ORDER — TRAMADOL HYDROCHLORIDE 50 MG/1
50 TABLET ORAL EVERY 4 HOURS PRN
Qty: 10 TABLET | Refills: 0 | Status: SHIPPED | OUTPATIENT
Start: 2025-07-04

## 2025-07-04 RX ORDER — METHYLPREDNISOLONE 4 MG/1
TABLET ORAL
Qty: 21 TABLET | Refills: 0 | Status: SHIPPED | OUTPATIENT
Start: 2025-07-04

## 2025-07-04 NOTE — DISCHARGE SUMMARY
Hospitalist Discharge Summary    Admission Date/Time  7/1/2025 10:44 PM    Discharge Date    07/04/25    PCP  Ira Fowler MD     Discharging Hospitalist:  Ben Bonilla MD    Disposition:  home    Follow Up Appointments  Follow up with PCP within 2 weeks     Ira Fowler MD  [address]  [address]  [place] [address] [phone]    Schedule an appointment as soon as possible for a visit  post hospitalization follow up       Follow-up Information       Ira Fowler MD. Schedule an appointment as soon as possible for a visit.    Specialty: Internal Medicine  Why: post hospitalization follow up  Contact information:  [address]  [address]  [place] [address] [phone]                                 Discharge Diagnosis  Back pain, lumbosacral [M54.50]    Primary Hospital Problems/Hospital Course Summary    84 year old female with PMH sig for HTN, HL, carotid disease, depression/anxiety and hx of back pain p/w acute back pain. She rolled over in bed late last night and felt a sharp pain in her lower back. She could barely move due to pain so her family called EMS. She's been having worsening issues for the last 10 days, her PCP prescirbed her steroid therapy which did not improve her symptoms. OTC meds also were not helping. Given the worsening pain she came to the ED.  In the ED she was hypertensive. Labs unremarkable. XR with chronic arthritic changes. She couldn't walk in the ER so she was admitted for further evaluation and treatment.     Acute on chronic back pain  - medrol dose pradeep - complete on dc  - prn analgesics - dc with tramadol, discussed not mixing with her xanax dosing  - cont gabapentin 100 TID on dc  - PT/OT  - will be unable to get an MRI due to the waitlist and my not be completed until Monday, she does not want to wait until then, feels ready for dc - has OP MRI scheduled on 7/10      Carotid disease - asa, statin  Essential HTN - losartan,  thiazide  Depression/anxiety - sertraline      Procedures/Diagnostics       Code Status:     Code Status: Prior    Discharge Medications       Medication List        START taking these medications      methylPREDNISolone 4 MG Tbpk  Commonly known as: Medrol Dosepak  Take as directed on dose pack instructions. Start on day 4     traMADol 50 MG Tabs  Commonly known as: Ultram  Take 1 tablet (50 mg total) by mouth every 4 (four) hours as needed for Pain.            CHANGE how you take these medications      gabapentin 100 MG Caps  Commonly known as: Neurontin  Take 1 capsule (100 mg total) by mouth 3 (three) times daily.  What changed: when to take this            CONTINUE taking these medications      ALPRAZolam 0.25 MG Tabs  Commonly known as: Xanax     aspirin 81 MG Tbec     * atorvastatin 20 MG Tabs  Commonly known as: Lipitor     butalbital-acetaminophen-caffeine -40 MG Tabs  Commonly known as: Fioricet  TAKE 1 TABLET BY MOUTH EVERY 4 HOURS AS NEEDED     docusate sodium 100 MG Caps  Commonly known as: COLACE  Take 100 mg by mouth 2 (two) times daily as needed for constipation.     hydroCHLOROthiazide 25 MG Tabs     losartan 25 MG Tabs  Commonly known as: Cozaar     Melatonin 5 MG Tabs     senna-docusate 8.6-50 MG Tabs  Commonly known as: Senokot-S     sertraline 50 MG Tabs  Commonly known as: Zoloft  Take 1 tablet (50 mg total) by mouth daily.           * This list has 1 medication(s) that are the same as other medications prescribed for you. Read the directions carefully, and ask your doctor or other care provider to review them with you.                ASK your doctor about these medications      * atorvastatin 10 MG Tabs  Commonly known as: Lipitor     clobetasol 0.05 % Oint  Commonly known as: Temovate     mupirocin 2 % Oint  Commonly known as: Bactroban  Apply to scalp BID           * This list has 1 medication(s) that are the same as other medications prescribed for you. Read the directions carefully,  and ask your doctor or other care provider to review them with you.                   Where to Get Your Medications        These medications were sent to [id number] - [place], [place] - [facility] [address], [phone], [phone]  [address], [facility] [address]      Phone: [phone]   gabapentin 100 MG Caps  methylPREDNISolone 4 MG Tbpk  traMADol 50 MG Tabs         Current and discharge medications reconciled on the day of discharge.    Imaging/Diagnostic Reports  XR LUMBAR SPINE (MIN 4 VIEWS) (CPT=72110)  Result Date: 7/2/2025  PROCEDURE: XR LUMBAR SPINE (MIN 4 VIEWS) (CPT=72110) INDICATIONS: Back Pain PATIENT STATED HISTORY: Patient fell out of bed. Pain to entire lumbar. unable to stand. COMPARISON: 06/09/2021 CT ABDOMEN+PELVIS(CONTRAST ONLY)(CPT=74177) FINDINGS: BONES: Dextroscoliosis lumbar spine. Vertebral body heights are overall maintained. DISC SPACES: Marked loss of L3-L4 disc base. Endplate osteoarthritic changes. PARASPINOUS: Negative.  No paraspinous abnormality is seen. OTHER:Negative.     CONCLUSION: Moderate to marked osteoarthritic changes are noted. Dextroscoliosis of the lumbar spine. Electronically Verified and Signed by Attending Radiologist: Walter Cadet MD 7/2/2025 12:00 AM Workstation: [id number]      Secondary Discharge Diagnoses  Past Medical History:    Anxiety state    Cancer (HCC)    SKIN CA    Carotid artery disease    <50%    Cervical osteoarthritis    Closed displaced fracture of left patella  Global  12/29/2019    Diverticulosis    History of squamous cell carcinoma    upper lip - Dr. Mckeon    HTN (hypertension)    Hypercholesterolemia    IBS (irritable bowel syndrome)    Lumbar disc disease    Migraines    Mild aortic regurgitation    ECHO at [facility]    Mild mitral regurgitation    ECHO at [facility]    Multiple thyroid nodules    last US 2018   no fu needed    Osteoporosis    PONV (postoperative nausea and vomiting)    Removal of hardward  from left knee  Global 09/17/2020    SCC (squamous cell carcinoma)    L neck per Dr. Mckeon    Tubular adenoma    Dr. Ramos    Vaginal burning    started on metrondiazole    Vitamin D deficiency       Pertinent Physical Exam At Time of Discharge  Vitals:    07/03/25 1933 07/03/25 2353 07/04/25 0500 07/04/25 0741   BP: 128/41 143/84 158/57 (!) 167/62   BP Location: Left arm Left arm Left arm Left arm   Pulse: 56 (!) 49 50 51   Resp: 17 17 17 16   Temp: 97.8 °F (36.6 °C) 97.7 °F (36.5 °C) 98.1 °F (36.7 °C) 98.1 °F (36.7 °C)   TempSrc: Oral Oral Oral Oral   SpO2: 97% 96% 94% 100%   Weight:       Height:            General: no acute distress  Heart: RRR  Lungs: CTAB  Abd: Soft, NT, ND  Neuro: no focal deficits    Total time coordinating care > 30 mins.    Patient/family had opportunity to ask questions, stated understanding and agreed with therapeutic plan as outlined.     Indiana Bonilla MD  7/4/2025

## 2025-07-04 NOTE — PLAN OF CARE
A&OX4. Maintaining O2 on RA. Tele, NSR. denies pain. Up add charmaine. PIV SL. Regular diet. No further needs at this time. Continue POC. Safety precaution in place.   Problem: Patient/Family Goals  Goal: Patient/Family Long Term Goal  Description: Patient's Long Term Goal: Discharge    Interventions:  - consults, MRI  - See additional Care Plan goals for specific interventions  Outcome: Progressing  Goal: Patient/Family Short Term Goal  Description: Patient's Short Term Goal: 7/2 noc: rest  7/3 NOC;rest sleep    Interventions:   - cluster care  - See additional Care Plan goals for specific interventions  Outcome: Progressing     Problem: RISK FOR INFECTION - ADULT  Goal: Absence of fever/infection during anticipated neutropenic period  Description: INTERVENTIONS  - Monitor WBC  - Administer growth factors as ordered  - Implement neutropenic guidelines  Outcome: Progressing     Problem: SAFETY ADULT - FALL  Goal: Free from fall injury  Description: INTERVENTIONS:  - Assess pt frequently for physical needs  - Identify cognitive and physical deficits and behaviors that affect risk of falls.  - [place] fall precautions as indicated by assessment.  - Educate pt/family on patient safety including physical limitations  - Instruct pt to call for assistance with activity based on assessment  - Modify environment to reduce risk of injury  - Provide assistive devices as appropriate  - Consider OT/PT consult to assist with strengthening/mobility  - Encourage toileting schedule  Outcome: Progressing     Problem: DISCHARGE PLANNING  Goal: Discharge to home or other facility with appropriate resources  Description: INTERVENTIONS:  - Identify barriers to discharge w/pt and caregiver  - Include patient/family/discharge partner in discharge planning  - Arrange for needed discharge resources and transportation as appropriate  - Identify discharge learning needs (meds, wound care, etc)  - Arrange for interpreters to assist at discharge as  needed  - Consider post-discharge preferences of patient/family/discharge partner  - Complete POLST form as appropriate  - Assess patient's ability to be responsible for managing their own health  - Refer to Case Management Department for coordinating discharge planning if the patient needs post-hospital services based on physician/LIP order or complex needs related to functional status, cognitive ability or social support system  Outcome: Progressing

## 2025-07-04 NOTE — PROGRESS NOTES
NURSING DISCHARGE NOTE    Discharged Home via Wheelchair.  Accompanied by Support staff  Belongings Taken by patient/family.    Discharge education reviewed with pt and pt daughter. New medication and follow ups discussed. All questions and concerns addressed, pt and pt daughter verbalized understanding. Discharge packet prepared and sent with patient. PIV and tele removed. Pt ready for discharge.

## 2025-07-17 ENCOUNTER — PATIENT OUTREACH (OUTPATIENT)
Age: 85
End: 2025-07-17

## 2025-07-17 NOTE — PROGRESS NOTES
07/17/25 1348   Call Details   Call Attempt # 1   SDOH Domain(s) with needs Other (Comment)  (Readmission/Hospitilization F/U)     Unable to speak to patient. Left voicemail, asked patient to call back [phone] to discuss recent hospitalization and address any SDOH needs.

## 2025-08-04 ENCOUNTER — PATIENT OUTREACH (OUTPATIENT)
Age: 85
End: 2025-08-04

## 2025-08-21 RX ORDER — HYDROCHLOROTHIAZIDE 25 MG/1
25 TABLET ORAL DAILY
Qty: 90 TABLET | Refills: 1 | Status: SHIPPED | OUTPATIENT
Start: 2025-08-21

## 2026-05-26 ENCOUNTER — APPOINTMENT (OUTPATIENT)
Dept: CARDIOLOGY | Age: 86
End: 2026-05-26

## (undated) DEVICE — STERILE POLYISOPRENE POWDER-FREE SURGICAL GLOVES: Brand: PROTEXIS

## (undated) DEVICE — Device

## (undated) DEVICE — CAST TAPE SYNTH 6

## (undated) DEVICE — FILTERLINE NASAL ADULT O2/CO2

## (undated) DEVICE — GAMMEX® PI HYBRID SIZE 8.5, STERILE POWDER-FREE SURGICAL GLOVE, POLYISOPRENE AND NEOPRENE BLEND: Brand: GAMMEX

## (undated) DEVICE — ZIMMER® STERILE DISPOSABLE TOURNIQUET CUFF WITH PLC, DUAL PORT, SINGLE BLADDER, 34 IN. (86 CM)

## (undated) DEVICE — 1200CC GUARDIAN II: Brand: GUARDIAN

## (undated) DEVICE — CHLORAPREP 26ML APPLICATOR

## (undated) DEVICE — UNIVERSAL STERIBUMP® STERILE (5/CASE): Brand: UNIVERSAL STERIBUMP®

## (undated) DEVICE — SUTURE VICRYL 2-0 CP-1

## (undated) DEVICE — SOL  .9 1000ML BTL

## (undated) DEVICE — KENDALL SCD EXPRESS SLEEVES, KNEE LENGTH, MEDIUM: Brand: KENDALL SCD

## (undated) DEVICE — ENDOSCOPY PACK - LOWER: Brand: MEDLINE INDUSTRIES, INC.

## (undated) DEVICE — SUTURE VICRYL 0 CP-2

## (undated) DEVICE — STERILE HOOK LOCK LATEX FREE ELASTIC BANDAGE 6INX5YD: Brand: HOOK LOCK™

## (undated) DEVICE — ABDOMINAL PAD: Brand: DERMACEA

## (undated) DEVICE — PROXIMATE SKIN STAPLERS (35 WIDE) CONTAINS 35 STAINLESS STEEL STAPLES (FIXED HEAD): Brand: PROXIMATE

## (undated) DEVICE — PADDING CAST COTTON  4

## (undated) DEVICE — NON-ADHERENT STRIPS,OIL EMULSION: Brand: CURITY

## (undated) DEVICE — Device: Brand: DEFENDO AIR/WATER/SUCTION AND BIOPSY VALVE

## (undated) DEVICE — LOWER EXTREMITY CDS-LF: Brand: MEDLINE INDUSTRIES, INC.

## (undated) DEVICE — 3M™ RED DOT™ MONITORING ELECTRODE WITH FOAM TAPE AND STICKY GEL, 50/BAG, 20/CASE, 72/PLT 2570: Brand: RED DOT™

## (undated) DEVICE — 3M™ IOBAN™ 2 ANTIMICROBIAL INCISE DRAPE 6648EZ: Brand: IOBAN™ 2

## (undated) DEVICE — POST OP TSCOPE PREMIER

## (undated) DEVICE — PADDING CAST COTTON STER 6

## (undated) DEVICE — SUTURE VICRYL 2-0 FS-1

## (undated) DEVICE — 3M™ STERI-DRAPE™ U-DRAPE 1015: Brand: STERI-DRAPE™

## (undated) DEVICE — NEEDLE ANCHOR 1827-2 7/8

## (undated) DEVICE — REM POLYHESIVE ADULT PATIENT RETURN ELECTRODE: Brand: VALLEYLAB

## (undated) NOTE — LETTER
Elizabeth Scott 182 6 13[facility]  [place], [address] [place] [address]    Consent for Operation  Date: __________________                                Time: _______________    1.  I authorize the performance upon Jessica Godinez the following operation:  Proced procedure has been videotaped, the surgeon will obtain the original videotape. The hospital will not be responsible for storage or maintenance of this tape.   7. For the purpose of advancing medical education, I consent to the admittance of observers to the STATEMENTS REQUIRING INSERTION OR COMPLETION WERE FILLED IN.     Signature of Patient:   ___________________________    When the patient is a minor or mentally incompetent to give consent:  Signature of person authorized to consent for patient: ____________ supplements, and pills I can buy without a prescription (including street drugs/illegal medications). Failure to inform my anesthesiologist about these medicines may increase my risk of anesthetic complications. iv.  If I am allergic to anything or have ha Anesthesiologist Signature     Date   Time  I have discussed the procedure and information above with the patient (or patient’s representative) and answered their questions. The patient or their representative has agreed to have anesthesia services.     ___

## (undated) NOTE — IP AVS SNAPSHOT
Patient Demographics     Address  [address] [address] Phone  [phone] [facility])      Emergency Contact(s)     Name Relation Home Work Vivian Amato Daughter [phone]      Allergies as atorvastatin 10 MG Tabs  Commonly known as:  LIPITOR      Take 10 mg by mouth daily. Butalbital-APAP-Caffeine -40 MG Tabs  Commonly known as:  FIORICET, ESGIC      Take 1 tablet by mouth every 4 (four) hours as needed for Pain.           docu [id number] Senna-Docusate Sodium (SENOKOT S) 8.6-50 MG tab 1 tablet 10/03/19 0759 Given      [id number] Sertraline HCl (ZOLOFT) tab 25 mg 10/02/19 2042 Given      [id number] aspirin tab 325 mg 10/02/19 2042 Given      [id number] aspirin tab 325 mg 10/03/19 07 BUN/CREA Ratio 14.3 10.0 - 20.0 — [facility]   Calcium, Total 8.5 8.5 - 10.1 mg/dL [facility]   Calculated Osmolality 285 275 - 295 mOsm/kg — [facility]   GFR, Non- 83 >=60 — [facility]   GFR, -American 96 >=60 — [employer] fall to the ED. She tripped this morning and fell onto her left knee. She had significant knee pain so presented to the ED. Was brought in by EMS and given IV fentanyl. She did not have dizziness or lightheadedness. Did not hit her head.  In the ED x-ray sh Butalbital-APAP-Caffeine -40 MG Oral Tab Take 1 tablet by mouth every 4 (four) hours as needed for Pain. Disp:  Rfl:    ALPRAZolam (XANAX) 0.25 MG Oral Tab Take 1 tablet (0.25 mg total) by mouth nightly as needed.  Disp: 30 tablet Rfl: 5   Senna-Eloise Skin: no rashes/lesions  Psych: normal mood/affect          Diagnostic Data:    CBC/Chem[SB. 1]  Recent Labs   Lab 09/30/19  0949   WBC 5.9   HGB 12.8   MCV 92.9   .0   INR 1.04       Recent Labs   Lab 09/30/19  0949      K 3.7      CO2 2 effusion, or pneumothorax. Two small round radiopacities and a short coil project in the region of the gastroesophageal junction may be overlying the patient, correlate clinically. CONCLUSION:  Hyperinflation of the lungs.     Dictated by: Lexington Hence, Based on patients current state of illness, I anticipate that, after discharge, patient will require TBD.[SB.1]    Electronically signed by Paxton James MD on 9/30/2019 12:54 PM   Attribution Jones    SB. 1 - Paxton James MD on 9/30/2019 12:10 PM  SB. 3 cm of separation of the fracture fragments anteriorly. This extends to the articular surface. Overlying soft tissue swelling. No large suprapatellar joint effusion.   Medial   compartmental joint space narrowing consistent with arthropathy      On 9/30 HYDROcodone-acetaminophen (NORCO)  MG per tab 1 tablet 1 tablet Oral Q4H PRN   influenza vaccine (PF)(FLUZONE HD) high dose for 65 yrs & older inj 0.5ml 0.5 mL Intramuscular Prior to discharge   [COMPLETED] morphINE sulfate (PF) 4 MG/ML injection 4 m FLEET ENEMA (FLEET) 7-19 GM/118ML enema 133 mL 1 enema Rectal Once PRN   dextrose 5 %-0.45 % NaCl infusion  Intravenous Continuous   aspirin tab 325 mg 325 mg Oral BID   ondansetron HCl (ZOFRAN) injection 4 mg 4 mg Intravenous Q6H PRN   [COMPLETED] ceFAZol Head:  Normocephalic, without obvious abnormality, atraumatic. Eyes:  Conjunctivae/lids clear. PERRL, EOMs intact. Vision functional.   Ears/Nose/Throat: Hearing intact. Lips, mucosa, and tongue normal. Teeth and gums normal. Moist mucous membranes. Risk for infection. Risk for contractures and stiffness with consequent functional impairments.     Risk for thromboembolic disease with need for careful DVT prophylaxis, potential for bleeding and hematoma or hemarthrosis (and aggravating anemia) with anti PHYSICAL THERAPY TREATMENT NOTE - INPATIENT    Room Number: 360/360-A     Session: 1   Number of Visits to Meet Established Goals: 5    Presenting Problem: s/p ORIF left patella fracture 9/30/19    Problem List  Principal Problem:    Closed displaced frac Static Sitting: Fair +  Dynamic Sitting: Fair +           Static Standing: Fair -  Dynamic Standing: Poor +    ACTIVITY TOLERANCE                         O2 WALK                    AM-PAC '6-Clicks' INPATIEN educated on AP. Unable to progress d/t severe nausea. Pt left in chair, needs met.      THERAPEUTIC EXERCISES  Lower Extremity Ankle pumps     Upper Extremity n/a     Position Sitting     Repetitions   10   Sets   1     Patient End of Session: Up in chair;N Physical Therapy Note signed by Kassidy Villagomez PT at 10/1/2019  4:38 PM  Version 1 of 1    Author:  Kassidy Villagomez PT Service:  Rehab Author Type:  Physical Therapist    Filed:  10/1/2019  4:38 PM Date of Service:  10/1/2019  3:13 PM Status:  Bernice Blair • Vitamin D deficiency 6/17/2014       Past Surgical History  Past Surgical History:   Procedure Laterality Date   • COLONOSCOPY  2010    Dr. Simran Dalal   • COLONOSCOPY N/A 11/8/2013    Performed by Adam Sanchez MD at [facility] ENDOSCOPY   • HYSTERECTOMY  2000   • · Safety Judgement:  good awareness of safety precautions[CM. 2]    RANGE OF MOTION AND STRENGTH ASSESSMENT  Upper extremity ROM and strength are within functional limits     Lower extremity ROM is within functional limits     Lower extremity strength is wi Skilled Therapy Provided:[CM.1] PT orders received for PT eval and treat with WBAT L LE with L knee hinged brace in place. Per pt report, MD protocol is no moving the knee, always with brace donned. Knee hinged brace locked in extension.  Made adjustments t and concerns addressed;SCDs in place; Discussed recommendations with /    ASSESSMENT   Patient is a 67year old female admitted on 9/30/2019 for s/p L ORIF of left patellar fracture on 9/30/19.   Pertinent comorbidities and personal to address the above deficits to assist patient in returning to prior to level of function. DISCHARGE RECOMMENDATIONS  PT Discharge Recommendations: Acute rehabilitation    PLAN  PT Treatment Plan: Bed mobility; Body mechanics; Don/doff brace; Endurance; Ener Prolia Im Inj, Up To 60 Mg 01/26/17       Future Appointments        Provider Department Center    1/13/2020 1:00 PM Temo Reed MD [facility] Internal Medicine - [place], [address] [facility]

## (undated) NOTE — IP AVS SNAPSHOT
[address]            (For Outpatient Use Only) Initial Admit Date: 9/30/2019   Inpt/Obs Admit Date: Inpt: 9/30/19 / Obs: N/A   Discharge Date:    Aaron Mora:  [de-identified]   MRN: [de-identified]   CSN: [id number]   CEID: [id number] Subscriber ID: [id number] Pt Rel to Subscriber: SELF   TERTIARY INSURANCE   Payor:  Plan:    Group Number:  Insurance Type:    Subscriber Name:  Subscriber :    Subscriber ID:  Pt Rel to Subscriber:    Hospital Account Financial Class: Medicare    Oct